# Patient Record
Sex: FEMALE | Race: WHITE | NOT HISPANIC OR LATINO | Employment: OTHER | URBAN - METROPOLITAN AREA
[De-identification: names, ages, dates, MRNs, and addresses within clinical notes are randomized per-mention and may not be internally consistent; named-entity substitution may affect disease eponyms.]

---

## 2018-02-15 ENCOUNTER — TRANSCRIBE ORDERS (OUTPATIENT)
Dept: ADMINISTRATIVE | Facility: HOSPITAL | Age: 63
End: 2018-02-15

## 2018-02-15 DIAGNOSIS — Z12.39 SCREENING BREAST EXAMINATION: Primary | ICD-10-CM

## 2018-02-20 ENCOUNTER — HOSPITAL ENCOUNTER (OUTPATIENT)
Dept: MAMMOGRAPHY | Facility: HOSPITAL | Age: 63
Discharge: HOME | End: 2018-02-20
Payer: COMMERCIAL

## 2018-02-20 DIAGNOSIS — Z12.39 SCREENING BREAST EXAMINATION: ICD-10-CM

## 2018-02-20 PROCEDURE — 77067 SCR MAMMO BI INCL CAD: CPT

## 2018-04-01 PROBLEM — R41.3 MEMORY LOSS: Status: ACTIVE | Noted: 2018-01-17

## 2018-04-01 PROBLEM — M21.372 LEFT FOOT DROP: Status: ACTIVE | Noted: 2018-01-17

## 2018-04-02 ENCOUNTER — OFFICE VISIT (OUTPATIENT)
Dept: FAMILY MEDICINE CLINIC | Facility: CLINIC | Age: 63
End: 2018-04-02
Payer: COMMERCIAL

## 2018-04-02 VITALS
OXYGEN SATURATION: 97 % | BODY MASS INDEX: 28.49 KG/M2 | RESPIRATION RATE: 16 BRPM | WEIGHT: 188 LBS | HEART RATE: 80 BPM | TEMPERATURE: 98.6 F | HEIGHT: 68 IN | SYSTOLIC BLOOD PRESSURE: 132 MMHG | DIASTOLIC BLOOD PRESSURE: 80 MMHG

## 2018-04-02 DIAGNOSIS — Z23 NEED FOR HEPATITIS B VACCINATION: Primary | ICD-10-CM

## 2018-04-02 DIAGNOSIS — G35 MULTIPLE SCLEROSIS (HCC): ICD-10-CM

## 2018-04-02 PROCEDURE — 99212 OFFICE O/P EST SF 10 MIN: CPT | Performed by: FAMILY MEDICINE

## 2018-04-02 PROCEDURE — 90471 IMMUNIZATION ADMIN: CPT | Performed by: FAMILY MEDICINE

## 2018-04-02 PROCEDURE — 90746 HEPB VACCINE 3 DOSE ADULT IM: CPT | Performed by: FAMILY MEDICINE

## 2018-04-02 RX ORDER — BACLOFEN 10 MG/1
TABLET ORAL
COMMUNITY
Start: 2018-01-17

## 2018-04-02 RX ORDER — GABAPENTIN 300 MG/1
300 CAPSULE ORAL
COMMUNITY
Start: 2018-01-17

## 2018-04-02 RX ORDER — UBIDECARENONE 75 MG
CAPSULE ORAL
COMMUNITY
Start: 2012-02-02

## 2018-04-02 RX ORDER — NAPROXEN 500 MG/1
1 TABLET ORAL 2 TIMES DAILY
COMMUNITY
Start: 2013-06-14 | End: 2018-10-17

## 2018-04-02 RX ORDER — FLUTICASONE PROPIONATE 50 MCG
2 SPRAY, SUSPENSION (ML) NASAL DAILY
COMMUNITY
Start: 2015-12-31 | End: 2018-10-17

## 2018-04-02 RX ORDER — ASCORBIC ACID 500 MG
500 TABLET ORAL
COMMUNITY
Start: 2013-04-19

## 2018-04-02 RX ORDER — NAPROXEN 375 MG/1
TABLET ORAL
COMMUNITY
Start: 2017-11-28

## 2018-04-02 RX ORDER — IBUPROFEN 800 MG/1
800 TABLET ORAL
COMMUNITY
Start: 2012-02-02

## 2018-04-02 RX ORDER — FESOTERODINE FUMARATE 4 MG/1
4 TABLET, EXTENDED RELEASE ORAL
COMMUNITY
Start: 2014-06-27 | End: 2018-10-17

## 2018-04-02 RX ORDER — VITAMIN E (DL,TOCOPHERYL ACET) 100 %
LIQUID (ML) MISCELLANEOUS
COMMUNITY
Start: 2013-04-19

## 2018-04-02 RX ORDER — FERROUS SULFATE 325(65) MG
TABLET ORAL
COMMUNITY
Start: 2013-04-19

## 2018-04-02 RX ORDER — NARATRIPTAN 2.5 MG/1
2.5 TABLET ORAL
COMMUNITY
Start: 2015-12-18

## 2018-04-02 RX ORDER — MAGNESIUM CARB/ALUMINUM HYDROX 105-160MG
TABLET,CHEWABLE ORAL
COMMUNITY
Start: 2008-01-15

## 2018-04-02 RX ORDER — ACYCLOVIR 50 MG/G
5 OINTMENT TOPICAL
COMMUNITY
Start: 2012-06-01 | End: 2021-06-30 | Stop reason: SDUPTHER

## 2018-04-02 RX ORDER — SENNA PLUS 8.6 MG/1
TABLET ORAL
COMMUNITY

## 2018-04-02 RX ORDER — SERTRALINE HYDROCHLORIDE 100 MG/1
1 TABLET, FILM COATED ORAL DAILY
COMMUNITY
End: 2018-10-17

## 2018-04-02 NOTE — PROGRESS NOTES
Chief Complaint   Patient presents with    Hepatitis C    Pneumonia    Herpes Zoster   /80 (BP Location: Left arm, Patient Position: Sitting, Cuff Size: Adult)   Pulse 80   Temp 98 6 °F (37 °C)   Resp 16   Ht 5' 8" (1 727 m)   Wt 85 3 kg (188 lb)   SpO2 97%   BMI 28 59 kg/m²     Patient presents primarily to discuss getting immunizations  She has primary progressive multiple sclerosis  She has failed multiple treatments and will be starting a Ocrevus in the near future  She has been informed by her neurologist that she should be immunized against hepatitis-B prior to starting this treatment  Once she is on it she will not be able to receive live virus vaccines  She also wishes to discuss shingles vaccine  Assessment/plan    Reviewed cautions as it pertains to administration of Ocrevus which patient will receive every 6 months indefinitely  It is unclear to me whether vaccinations or live vaccines are prohibited during treatment  I recommended that we go ahead and initiate her hepatitis B vaccine  She will need a 2nd dose in 1 month and the 3rd dose in 6 months  She will discuss with her neurologist the specifics of the vaccine prohibition  If it only extends to live virus vaccines she can begin her MS treatment any time and we can also consider giving her zoster vaccine at her convenience  1  Need for hepatitis B vaccination  HEPATITIS B VACCINE ADULT IM   2   Multiple sclerosis ([facility] [address] )

## 2018-04-03 DIAGNOSIS — Z23 NEED FOR VACCINATION: Primary | ICD-10-CM

## 2018-04-03 NOTE — TELEPHONE ENCOUNTER
Idania Carson To Sent  4/3/2018 10:10 AM   I contacted my MS Dr and she agrees that it would be best for me to have the Shingrex shot now between the second Hep shot and the month waiting period before I can start my new MS treatment with Ocrevus  She felt it is not necessary to have the 3rd Hep shot  Please prepare the script for the Shringrex so I can get it filled at the pharmacy     Many thanks   [address] [place] [address]

## 2018-04-17 ENCOUNTER — PATIENT MESSAGE (OUTPATIENT)
Dept: FAMILY MEDICINE CLINIC | Facility: CLINIC | Age: 63
End: 2018-04-17

## 2018-10-17 ENCOUNTER — ANNUAL EXAM (OUTPATIENT)
Dept: GYNECOLOGY | Facility: CLINIC | Age: 63
End: 2018-10-17
Payer: COMMERCIAL

## 2018-10-17 VITALS
DIASTOLIC BLOOD PRESSURE: 78 MMHG | SYSTOLIC BLOOD PRESSURE: 136 MMHG | BODY MASS INDEX: 30.05 KG/M2 | HEART RATE: 80 BPM | HEIGHT: 66 IN | RESPIRATION RATE: 15 BRPM | WEIGHT: 187 LBS

## 2018-10-17 DIAGNOSIS — Z11.51 SCREENING FOR HUMAN PAPILLOMAVIRUS: ICD-10-CM

## 2018-10-17 DIAGNOSIS — Z12.31 ENCOUNTER FOR SCREENING MAMMOGRAM FOR MALIGNANT NEOPLASM OF BREAST: ICD-10-CM

## 2018-10-17 DIAGNOSIS — N39.41 URGE INCONTINENCE: ICD-10-CM

## 2018-10-17 DIAGNOSIS — Z01.419 ENCOUNTER FOR WELL WOMAN EXAM WITH ROUTINE GYNECOLOGICAL EXAM: Primary | ICD-10-CM

## 2018-10-17 DIAGNOSIS — N95.2 POSTMENOPAUSAL ATROPHIC VAGINITIS: ICD-10-CM

## 2018-10-17 PROCEDURE — G0145 SCR C/V CYTO,THINLAYER,RESCR: HCPCS | Performed by: OBSTETRICS & GYNECOLOGY

## 2018-10-17 PROCEDURE — 87624 HPV HI-RISK TYP POOLED RSLT: CPT | Performed by: OBSTETRICS & GYNECOLOGY

## 2018-10-17 PROCEDURE — 99396 PREV VISIT EST AGE 40-64: CPT | Performed by: OBSTETRICS & GYNECOLOGY

## 2018-10-17 RX ORDER — TOLTERODINE 4 MG/1
4 CAPSULE, EXTENDED RELEASE ORAL DAILY
Qty: 90 CAPSULE | Refills: 4 | Status: SHIPPED | OUTPATIENT
Start: 2018-10-17 | End: 2019-10-17 | Stop reason: SDUPTHER

## 2018-10-17 NOTE — PROGRESS NOTES
Assessment/Plan:       Diagnoses and all orders for this visit:    Encounter for well woman exam with routine gynecological exam  -     Liquid-based pap, screening    Screening for human papillomavirus  -     Liquid-based pap, screening    Encounter for screening mammogram for malignant neoplasm of breast  -     Mammo screening bilateral w 3d & cad; Future    Postmenopausal atrophic vaginitis    Urge incontinence  -     tolterodine (DETROL LA) 4 mg 24 hr capsule; Take 1 capsule (4 mg total) by mouth daily    Other orders  -     ocrelizumab (OCREVUS) 300 MG/10ML SOLN; Infuse into a venous catheter once          Subjective:      Patient ID: Christelle Amaya is a 58 y o  female  The patient is a 74-year-old who presents for an annual gyn exam   She has had a previous hysterectomy and BSO for endometrial hyperplasia  She has had no vaginal bleeding  The patient denies any breast problems  The patient has been taking extended-release Detrol for urinary frequency and incontinence  She states this is were working fairly well  She still wears a pad just in case  She does not always move fast enough with her MS to make it to the bathroom on time  Overall she is happy with the results and wishes to continue  She is not sexually active  The following portions of the patient's history were reviewed and updated as appropriate: allergies, current medications, past family history, past medical history, past social history, past surgical history and problem list     Review of Systems   Constitutional: Negative  Respiratory: Negative for shortness of breath  Cardiovascular: Negative for chest pain  Gastrointestinal: Negative  Genitourinary: Negative  Musculoskeletal: Positive for gait problem  Skin: Negative  Psychiatric/Behavioral: Negative for agitation, behavioral problems and confusion           Objective:      /78 (Cuff Size: Adult)   Pulse 80   Resp 15   Ht 5' 6" (1 676 m)   Wt 84 8 kg (187 lb)   BMI 30 18 kg/m²          Physical Exam   Constitutional: She appears well-developed and well-nourished  No distress  HENT:   Head: Normocephalic  Pulmonary/Chest: Effort normal  No respiratory distress  Abdominal: She exhibits no distension and no mass  There is no tenderness  There is no rebound and no guarding  Genitourinary: Rectum normal and uterus normal  No breast swelling, tenderness, discharge or bleeding  No labial fusion  There is no rash, tenderness, lesion or injury on the right labia  There is no rash, tenderness, lesion or injury on the left labia  No erythema, tenderness or bleeding in the vagina  No foreign body in the vagina  No signs of injury around the vagina  No vaginal discharge found  Genitourinary Comments: Atrophic vaginal changes are present  Lymphadenopathy:        Right axillary: No pectoral and no lateral adenopathy present  Left axillary: No pectoral and no lateral adenopathy present  Neurological: Coordination abnormal    Weakness left leg   Skin: Skin is warm, dry and intact  She is not diaphoretic  No cyanosis  Nails show no clubbing  Psychiatric: She has a normal mood and affect   Her speech is normal and behavior is normal

## 2018-10-19 LAB
HPV HR 12 DNA CVX QL NAA+PROBE: NEGATIVE
HPV16 DNA CVX QL NAA+PROBE: NEGATIVE
HPV18 DNA CVX QL NAA+PROBE: NEGATIVE

## 2018-10-23 LAB
LAB AP GYN PRIMARY INTERPRETATION: NORMAL
Lab: NORMAL

## 2019-06-27 ENCOUNTER — HOSPITAL ENCOUNTER (OUTPATIENT)
Dept: RADIOLOGY | Age: 64
Discharge: HOME | End: 2019-06-27
Payer: COMMERCIAL

## 2019-06-27 VITALS — BODY MASS INDEX: 29.73 KG/M2 | HEIGHT: 66 IN | WEIGHT: 185 LBS

## 2019-06-27 DIAGNOSIS — Z12.31 ENCOUNTER FOR SCREENING MAMMOGRAM FOR MALIGNANT NEOPLASM OF BREAST: ICD-10-CM

## 2019-06-27 PROCEDURE — 77063 BREAST TOMOSYNTHESIS BI: CPT

## 2019-06-27 PROCEDURE — 77067 SCR MAMMO BI INCL CAD: CPT

## 2019-10-17 ENCOUNTER — ANNUAL EXAM (OUTPATIENT)
Dept: GYNECOLOGY | Facility: CLINIC | Age: 64
End: 2019-10-17
Payer: MEDICARE

## 2019-10-17 VITALS
RESPIRATION RATE: 18 BRPM | BODY MASS INDEX: 30.37 KG/M2 | DIASTOLIC BLOOD PRESSURE: 80 MMHG | HEIGHT: 66 IN | SYSTOLIC BLOOD PRESSURE: 130 MMHG | WEIGHT: 189 LBS | TEMPERATURE: 96.4 F | HEART RATE: 81 BPM

## 2019-10-17 DIAGNOSIS — Z01.419 ENCOUNTER FOR GYNECOLOGICAL EXAMINATION (GENERAL) (ROUTINE) WITHOUT ABNORMAL FINDINGS: Primary | ICD-10-CM

## 2019-10-17 DIAGNOSIS — Z12.39 BREAST CANCER SCREENING: ICD-10-CM

## 2019-10-17 DIAGNOSIS — N39.41 URGE INCONTINENCE: ICD-10-CM

## 2019-10-17 PROCEDURE — G0101 CA SCREEN;PELVIC/BREAST EXAM: HCPCS | Performed by: OBSTETRICS & GYNECOLOGY

## 2019-10-17 RX ORDER — TOLTERODINE 4 MG/1
4 CAPSULE, EXTENDED RELEASE ORAL DAILY
Qty: 90 CAPSULE | Refills: 4 | Status: SHIPPED | OUTPATIENT
Start: 2019-10-17 | End: 2021-06-30 | Stop reason: ALTCHOICE

## 2019-10-17 NOTE — PROGRESS NOTES
Assessment/Plan:       Diagnoses and all orders for this visit:    Encounter for gynecological examination (general) (routine) without abnormal findings    Urge incontinence  -     tolterodine (DETROL LA) 4 mg 24 hr capsule; Take 1 capsule (4 mg total) by mouth daily    Breast cancer screening  -     Mammo screening bilateral w cad; Future          Subjective:      Patient ID: Carole Roland is a 71 y o  female  The patient is a 26-year-old who presents for an annual gyn exam   She has had a previous hysterectomy and BSO for endometrial hyperplasia  She denies any vaginal bleeding, breast problems, or vaginal dryness  The patient has urge incontinence and she wears a pad all of the time  She attributes this to MS  It does respond somewhat to tolterodine and she would like to continue  The patient had a normal Pap and a negative HPV last year  These will not be repeated this year  She is not sexually active  Her last pap in June of this year was normal         The following portions of the patient's history were reviewed and updated as appropriate: allergies, current medications, past family history, past medical history, past social history, past surgical history and problem list     Review of Systems   Constitutional: Negative  Respiratory: Negative for shortness of breath  Cardiovascular: Negative for chest pain  Gastrointestinal: Negative  Genitourinary: Negative  Musculoskeletal: Positive for gait problem  Skin: Negative  Neurological: Positive for weakness  Psychiatric/Behavioral: Negative for agitation, behavioral problems and confusion  Objective:      /80 (BP Location: Left arm, Patient Position: Standing, Cuff Size: Standard)   Pulse 81   Temp (!) 96 4 °F (35 8 °C) (Tympanic)   Resp 18   Ht 5' 6" (1 676 m)   Wt 85 7 kg (189 lb)   BMI 30 51 kg/m²          Physical Exam   Constitutional: She appears well-developed and well-nourished  No distress  HENT:   Head: Normocephalic  Pulmonary/Chest: Effort normal  No respiratory distress  Right breast exhibits no inverted nipple, no mass, no nipple discharge, no skin change and no tenderness  Left breast exhibits no inverted nipple, no mass, no nipple discharge, no skin change and no tenderness  Breasts are symmetrical    Abdominal: She exhibits no distension and no mass  There is no tenderness  There is no rebound and no guarding  Genitourinary: Rectum normal  No labial fusion  There is no rash, tenderness, lesion or injury on the right labia  There is no rash, tenderness, lesion or injury on the left labia  No erythema, tenderness or bleeding in the vagina  No foreign body in the vagina  No signs of injury around the vagina  No vaginal discharge found  Genitourinary Comments: The urethral meatus is normal    Atrophic vaginal changes are present  The cervix, uterus and adnexa are surgically absent  Lymphadenopathy:        Right axillary: No pectoral and no lateral adenopathy present  Left axillary: No pectoral and no lateral adenopathy present  Skin: Skin is warm, dry and intact  She is not diaphoretic  No cyanosis  Nails show no clubbing  Psychiatric: She has a normal mood and affect   Her speech is normal and behavior is normal

## 2020-01-09 ENCOUNTER — OFFICE VISIT (OUTPATIENT)
Dept: URGENT CARE | Facility: CLINIC | Age: 65
End: 2020-01-09
Payer: MEDICARE

## 2020-01-09 VITALS
SYSTOLIC BLOOD PRESSURE: 148 MMHG | TEMPERATURE: 101.4 F | RESPIRATION RATE: 20 BRPM | OXYGEN SATURATION: 96 % | HEART RATE: 104 BPM | DIASTOLIC BLOOD PRESSURE: 88 MMHG

## 2020-01-09 DIAGNOSIS — J02.9 SORE THROAT: Primary | ICD-10-CM

## 2020-01-09 LAB — S PYO AG THROAT QL: NEGATIVE

## 2020-01-09 PROCEDURE — 99213 OFFICE O/P EST LOW 20 MIN: CPT | Performed by: NURSE PRACTITIONER

## 2020-01-09 PROCEDURE — 87880 STREP A ASSAY W/OPTIC: CPT | Performed by: NURSE PRACTITIONER

## 2020-01-09 PROCEDURE — G0463 HOSPITAL OUTPT CLINIC VISIT: HCPCS | Performed by: NURSE PRACTITIONER

## 2020-01-09 RX ORDER — CEFDINIR 300 MG/1
300 CAPSULE ORAL EVERY 12 HOURS SCHEDULED
Qty: 14 CAPSULE | Refills: 0 | Status: SHIPPED | OUTPATIENT
Start: 2020-01-09 | End: 2020-01-16

## 2020-01-09 RX ORDER — PREDNISONE 20 MG/1
20 TABLET ORAL 2 TIMES DAILY WITH MEALS
Qty: 10 TABLET | Refills: 0 | Status: SHIPPED | OUTPATIENT
Start: 2020-01-09 | End: 2020-01-14

## 2020-01-09 NOTE — PROGRESS NOTES
[facility]        NAME: Jose Vargas is a 71 y o  female  : 1955    MRN: [id number]  DATE: 2020  TIME: 5:22 PM    Assessment and Plan   Sore throat [J02 9]  1  Sore throat  POCT rapid strepA     Rapid strep negative   Start course of antibiotics due to fever/sore throat  Prednisone for ms flare and pharyngitis   Pt in agreement with plan of care  Patient Instructions     Follow up with PCP in 3-5 days  Proceed to  ER if symptoms worsen  Chief Complaint     Chief Complaint   Patient presents with    Sore Throat     Pt states 2 days of sore throat  Denies fever, cough or congestion  History of Present Illness   Jose Vargas presents to the clinic c/o    Yesterday started with sore throat -   Fever noted in office today   Cold, chills  Ears ache, throat pain  Denies body aches, headaches    Took Motrin and gargles with mouthwash, warm tea with honey  Still with appetite  Had the flu last week, followed by diarrhea  Has MS  -   MS flares will illness   Not on any immunosuppressants for the MS at this time -stopped them last year  Review of Systems   Review of Systems   All other systems reviewed and are negative          Current Medications     Long-Term Medications   Medication Sig Dispense Refill    acyclovir (ZOVIRAX) 5 % ointment Apply 5 % topically      Ascorbic Acid (VITAMIN C, ACEROLA,) 500 MG tablet Take 500 mg by mouth      baclofen 10 mg tablet May increase up to 4 times per day      calcium carbonate 1250 MG capsule Take 500 mg by mouth      Cholecalciferol 5000 units capsule Take 10,000 Units by mouth      cyanocobalamin (VITAMIN B-12) 100 mcg tablet 1 cap po daily      ferrous sulfate 325 (65 Fe) mg tablet daily      gabapentin (NEURONTIN) 300 mg capsule Take 300 mg by mouth      ibuprofen (MOTRIN) 800 mg tablet Take 800 mg by mouth      naproxen (NAPROSYN) 375 mg tablet ONE PO TID PRN PAIN      naratriptan (AMERGE) 2 5 MG tablet Take 2 5 mg by mouth      senna (SENOKOT TO GO) 8 6 MG tablet Take by mouth      tolterodine (DETROL LA) 4 mg 24 hr capsule Take 1 capsule (4 mg total) by mouth daily 90 capsule 4       Current Allergies     Allergies as of 01/09/2020 - Reviewed 01/09/2020   Allergen Reaction Noted    Penicillins Rash 01/28/2014            The following portions of the patient's history were reviewed and updated as appropriate: allergies, current medications, past family history, past medical history, past social history, past surgical history and problem list     Objective   /88   Pulse 104   Temp (!) 101 4 °F (38 6 °C) (Tympanic)   Resp 20   SpO2 96%        Physical Exam     Physical Exam   Constitutional: She is oriented to person, place, and time  Vital signs are normal  She appears well-developed and well-nourished  She is active and cooperative  HENT:   Head: Normocephalic and atraumatic  Right Ear: Hearing, tympanic membrane, external ear and ear canal normal    Left Ear: Hearing, tympanic membrane, external ear and ear canal normal    Nose: Nose normal  Right sinus exhibits no maxillary sinus tenderness and no frontal sinus tenderness  Left sinus exhibits no maxillary sinus tenderness and no frontal sinus tenderness  Mouth/Throat: Uvula is midline and mucous membranes are normal  Posterior oropharyngeal erythema present  Eyes: Conjunctivae and lids are normal    Cardiovascular: Normal rate, regular rhythm, S1 normal, S2 normal and normal heart sounds  Pulmonary/Chest: Effort normal and breath sounds normal    Lymphadenopathy:     She has cervical adenopathy  Neurological: She is alert and oriented to person, place, and time  Skin: Skin is warm and dry  Psychiatric: She has a normal mood and affect  Her speech is normal and behavior is normal  Judgment and thought content normal  Cognition and memory are normal    Nursing note and vitals reviewed

## 2020-01-09 NOTE — PATIENT INSTRUCTIONS
Sore Throat, Ambulatory Care   GENERAL INFORMATION:   A sore throat  is often caused by a cold or flu virus  A sore throat may also be caused by bacteria such as strep  Other causes include smoking, a runny nose, allergies, or acid reflux  Seek immediate care for the following symptoms:   · Trouble breathing or swallowing because your throat is swollen or sore    · Drooling because it hurts too much to swallow    · A painful lump in your throat that does not go away after 5 days    · A fever higher than 102? F (39?C) or lasts longer than 3 days    · Confusion    · Blood in your throat or ear  Treatment for a sore throat  will depend on the cause how severe it is  A sore throat cause by a virus will go away on its own without treatment  You will need antibiotics if your sore throat is caused by bacteria  Your sore throat should start to feel better within 3 to 5 days for both viral and bacterial infections  Care for your sore throat:   · Gargle with salt water  Mix ¼ teaspoon salt in a glass of warm water and gargle  This may help reduce swelling in your throat  · Take ibuprofen or acetaminophen:  These medicines decrease pain and fever  They are available without a doctor's order  Ask your healthcare provider which medicine is best for you  Ask how much to take and how often to take it  · Drink more liquids  Cold or warm drinks may help soothe your sore throat  Drinking liquids can also help prevent dehydration  · Use a cool-steam humidifier  to help moisten the air in your room and reduce your throat pain  · Use lozenges, ice, soft foods, or popsicles  to soothe your throat  · Rest your throat as much as possible  Try not to use your voice  This may irritate your throat and worsen your symptoms  Follow up with your healthcare provider as directed:  Write down your questions so you remember to ask them during your visits  CARE AGREEMENT:   You have the right to help plan your care   Learn about your health condition and how it may be treated  Discuss treatment options with your caregivers to decide what care you want to receive  You always have the right to refuse treatment  The above information is an  only  It is not intended as medical advice for individual conditions or treatments  Talk to your doctor, nurse or pharmacist before following any medical regimen to see if it is safe and effective for you  © 2014 [address] is for End User's use only and may not be sold, redistributed or otherwise used for commercial purposes  All illustrations and images included in CareNotes® are the copyrighted property of [employer]  or [employer]

## 2020-02-17 ENCOUNTER — OFFICE VISIT (OUTPATIENT)
Dept: FAMILY MEDICINE CLINIC | Facility: CLINIC | Age: 65
End: 2020-02-17
Payer: MEDICARE

## 2020-02-17 VITALS
WEIGHT: 189.8 LBS | BODY MASS INDEX: 30.5 KG/M2 | TEMPERATURE: 98.6 F | DIASTOLIC BLOOD PRESSURE: 76 MMHG | HEIGHT: 66 IN | OXYGEN SATURATION: 90 % | HEART RATE: 93 BPM | SYSTOLIC BLOOD PRESSURE: 138 MMHG

## 2020-02-17 DIAGNOSIS — Z23 NEED FOR VACCINATION: ICD-10-CM

## 2020-02-17 DIAGNOSIS — Z00.00 WELCOME TO MEDICARE PREVENTIVE VISIT: Primary | ICD-10-CM

## 2020-02-17 DIAGNOSIS — Z13.0 SCREENING FOR DEFICIENCY ANEMIA: ICD-10-CM

## 2020-02-17 DIAGNOSIS — Z13.1 SCREENING FOR DIABETES MELLITUS: ICD-10-CM

## 2020-02-17 DIAGNOSIS — Z13.6 SCREENING FOR CARDIOVASCULAR CONDITION: ICD-10-CM

## 2020-02-17 DIAGNOSIS — Z12.11 SCREEN FOR COLON CANCER: ICD-10-CM

## 2020-02-17 DIAGNOSIS — Z23 ENCOUNTER FOR IMMUNIZATION: ICD-10-CM

## 2020-02-17 DIAGNOSIS — G35 MULTIPLE SCLEROSIS (HCC): ICD-10-CM

## 2020-02-17 PROCEDURE — G0403 EKG FOR INITIAL PREVENT EXAM: HCPCS | Performed by: FAMILY MEDICINE

## 2020-02-17 PROCEDURE — G0402 INITIAL PREVENTIVE EXAM: HCPCS | Performed by: FAMILY MEDICINE

## 2020-02-17 PROCEDURE — 90662 IIV NO PRSV INCREASED AG IM: CPT | Performed by: FAMILY MEDICINE

## 2020-02-17 PROCEDURE — G0008 ADMIN INFLUENZA VIRUS VAC: HCPCS | Performed by: FAMILY MEDICINE

## 2020-02-17 PROCEDURE — 4040F PNEUMOC VAC/ADMIN/RCVD: CPT | Performed by: FAMILY MEDICINE

## 2020-02-17 NOTE — PATIENT INSTRUCTIONS
Medicare Preventive Visit Patient Instructions  Thank you for completing your Welcome to Medicare Visit or Medicare Annual Wellness Visit today  Your next wellness visit will be due in one year (2/17/2021)  The screening/preventive services that you may require over the next 5-10 years are detailed below  Some tests may not apply to you based off risk factors and/or age  Screening tests ordered at today's visit but not completed yet may show as past due  Also, please note that scanned in results may not display below  Preventive Screenings:  Service Recommendations Previous Testing/Comments   Colorectal Cancer Screening  * Colonoscopy    * Fecal Occult Blood Test (FOBT)/Fecal Immunochemical Test (FIT)  * Fecal DNA/Cologuard Test  * Flexible Sigmoidoscopy Age: 54-65 years old   Colonoscopy: every 10 years (may be performed more frequently if at higher risk)  OR  FOBT/FIT: every 1 year  OR  Cologuard: every 3 years  OR  Sigmoidoscopy: every 5 years  Screening may be recommended earlier than age 48 if at higher risk for colorectal cancer  Also, an individualized decision between you and your healthcare provider will decide whether screening between the ages of 74-80 would be appropriate  Colonoscopy: Not on file  FOBT/FIT: Not on file  Cologuard: Not on file  Sigmoidoscopy: Not on file         Breast Cancer Screening Age: 36 years old  Frequency: every 1-2 years  Not required if history of left and right mastectomy Mammogram: 06/27/2019    Screening Current   Cervical Cancer Screening Between the ages of 21-29, pap smear recommended once every 3 years  Between the ages of 33-67, can perform pap smear with HPV co-testing every 5 years     Recommendations may differ for women with a history of total hysterectomy, cervical cancer, or abnormal pap smears in past  Pap Smear: 10/17/2019    Screening Not Indicated   Hepatitis C Screening Once for adults born between 1945 and 1965  More frequently in patients at high risk for Hepatitis C Hep C Antibody: Not on file       Diabetes Screening 1-2 times per year if you're at risk for diabetes or have pre-diabetes Fasting glucose: No results in last 5 years   A1C: No results in last 5 years       Cholesterol Screening Once every 5 years if you don't have a lipid disorder  May order more often based on risk factors  Lipid panel: Not on file         Other Preventive Screenings Covered by Medicare:  1  Abdominal Aortic Aneurysm (AAA) Screening: covered once if your at risk  You're considered to be at risk if you have a family history of AAA  2  Lung Cancer Screening: covers low dose CT scan once per year if you meet all of the following conditions: (1) Age 50-69; (2) No signs or symptoms of lung cancer; (3) Current smoker or have quit smoking within the last 15 years; (4) You have a tobacco smoking history of at least 30 pack years (packs per day multiplied by number of years you smoked); (5) You get a written order from a healthcare provider  3  Glaucoma Screening: covered annually if you're considered high risk: (1) You have diabetes OR (2) Family history of glaucoma OR (3)  aged 48 and older OR (3)  American aged 72 and older  3  Osteoporosis Screening: covered every 2 years if you meet one of the following conditions: (1) You're estrogen deficient and at risk for osteoporosis based off medical history and other findings; (2) Have a vertebral abnormality; (3) On glucocorticoid therapy for more than 3 months; (4) Have primary hyperparathyroidism; (5) On osteoporosis medications and need to assess response to drug therapy  · Last bone density test (DXA Scan): Not on file  5  HIV Screening: covered annually if you're between the age of 12-76  Also covered annually if you are younger than 13 and older than 72 with risk factors for HIV infection  For pregnant patients, it is covered up to 3 times per pregnancy      Immunizations:  Immunization Recommendations Influenza Vaccine Annual influenza vaccination during flu season is recommended for all persons aged >= 6 months who do not have contraindications   Pneumococcal Vaccine (Prevnar and Pneumovax)  * Prevnar = PCV13  * Pneumovax = PPSV23   Adults 25-60 years old: 1-3 doses may be recommended based on certain risk factors  Adults 72 years old: Prevnar (PCV13) vaccine recommended followed by Pneumovax (PPSV23) vaccine  If already received PPSV23 since turning 65, then PCV13 recommended at least one year after PPSV23 dose  Hepatitis B Vaccine 3 dose series if at intermediate or high risk (ex: diabetes, end stage renal disease, liver disease)   Tetanus (Td) Vaccine - COST NOT COVERED BY MEDICARE PART B Following completion of primary series, a booster dose should be given every 10 years to maintain immunity against tetanus  Td may also be given as tetanus wound prophylaxis  Tdap Vaccine - COST NOT COVERED BY MEDICARE PART B Recommended at least once for all adults  For pregnant patients, recommended with each pregnancy  Shingles Vaccine (Shingrix) - COST NOT COVERED BY MEDICARE PART B  2 shot series recommended in those aged 48 and above     Health Maintenance Due:      Topic Date Due    Hepatitis C Screening  1955    CRC Screening: Colonoscopy  1955    MAMMOGRAM  06/27/2020     Immunizations Due:      Topic Date Due    Influenza Vaccine  07/01/2019    Pneumococcal Vaccine: 65+ Years (1 of 2 - PCV13) 07/09/2019     Advance Directives   What are advance directives? Advance directives are legal documents that state your wishes and plans for medical care  These plans are made ahead of time in case you lose your ability to make decisions for yourself  Advance directives can apply to any medical decision, such as the treatments you want, and if you want to donate organs  What are the types of advance directives?   There are many types of advance directives, and each state has rules about how to use them  You may choose a combination of any of the following:  · Living will: This is a written record of the treatment you want  You can also choose which treatments you do not want, which to limit, and which to stop at a certain time  This includes surgery, medicine, IV fluid, and tube feedings  · Durable power of  for healthcare [facility]): This is a written record that states who you want to make healthcare choices for you when you are unable to make them for yourself  This person, called a proxy, is usually a family member or a friend  You may choose more than 1 proxy  · Do not resuscitate (DNR) order:  A DNR order is used in case your heart stops beating or you stop breathing  It is a request not to have certain forms of treatment, such as CPR  A DNR order may be included in other types of advance directives  · Medical directive: This covers the care that you want if you are in a coma, near death, or unable to make decisions for yourself  You can list the treatments you want for each condition  Treatment may include pain medicine, surgery, blood transfusions, dialysis, IV or tube feedings, and a ventilator (breathing machine)  · Values history: This document has questions about your views, beliefs, and how you feel and think about life  This information can help others choose the care that you would choose  Why are advance directives important? An advance directive helps you control your care  Although spoken wishes may be used, it is better to have your wishes written down  Spoken wishes can be misunderstood, or not followed  Treatments may be given even if you do not want them  An advance directive may make it easier for your family to make difficult choices about your care  Fall Prevention    Fall prevention  includes ways to make your home and other areas safer  It also includes ways you can move more carefully to prevent a fall   Health conditions that cause changes in your blood pressure, vision, or muscle strength and coordination may increase your risk for falls  Medicines may also increase your risk for falls if they make you dizzy, weak, or sleepy  Fall prevention tips:   · Stand or sit up slowly  · Use assistive devices as directed  · Wear shoes that fit well and have soles that   · Wear a personal alarm  · Stay active  · Manage your medical conditions  Home Safety Tips:  · Add items to prevent falls in the bathroom  · Keep paths clear  · Install bright lights in your home  · Keep items you use often on shelves within reach  · Paint or place reflective tape on the edges of your stairs  Urinary Incontinence   Urinary incontinence (UI)  is when you lose control of your bladder  UI develops because your bladder cannot store or empty urine properly  The 3 most common types of UI are stress incontinence, urge incontinence, or both  Medicines:   · May be given to help strengthen your bladder control  Report any side effects of medication to your healthcare provider  Do pelvic muscle exercises often:  Your pelvic muscles help you stop urinating  Squeeze these muscles tight for 5 seconds, then relax for 5 seconds  Gradually work up to squeezing for 10 seconds  Do 3 sets of 15 repetitions a day, or as directed  This will help strengthen your pelvic muscles and improve bladder control  Train your bladder:  Go to the bathroom at set times, such as every 2 hours, even if you do not feel the urge to go  You can also try to hold your urine when you feel the urge to go  For example, hold your urine for 5 minutes when you feel the urge to go  As that becomes easier, hold your urine for 10 minutes  Self-care:   · Keep a UI record  Write down how often you leak urine and how much you leak  Make a note of what you were doing when you leaked urine  · Drink liquids as directed  You may need to limit the amount of liquid you drink to help control your urine leakage   Do not drink any liquid right before you go to bed  Limit or do not have drinks that contain caffeine or alcohol  · Prevent constipation  Eat a variety of high-fiber foods  Good examples are high-fiber cereals, beans, vegetables, and whole-grain breads  Walking is the best way to trigger your intestines to have a bowel movement  · Exercise regularly and maintain a healthy weight  Weight loss and exercise will decrease pressure on your bladder and help you control your leakage  · Use a catheter as directed  to help empty your bladder  A catheter is a tiny, plastic tube that is put into your bladder to drain your urine  · Go to behavior therapy as directed  Behavior therapy may be used to help you learn to control your urge to urinate  Weight Management   Why it is important to manage your weight:  Being overweight increases your risk of health conditions such as heart disease, high blood pressure, type 2 diabetes, and certain types of cancer  It can also increase your risk for osteoarthritis, sleep apnea, and other respiratory problems  Aim for a slow, steady weight loss  Even a small amount of weight loss can lower your risk of health problems  How to lose weight safely:  A safe and healthy way to lose weight is to eat fewer calories and get regular exercise  You can lose up about 1 pound a week by decreasing the number of calories you eat by 500 calories each day  Healthy meal plan for weight management:  A healthy meal plan includes a variety of foods, contains fewer calories, and helps you stay healthy  A healthy meal plan includes the following:  · Eat whole-grain foods more often  A healthy meal plan should contain fiber  Fiber is the part of grains, fruits, and vegetables that is not broken down by your body  Whole-grain foods are healthy and provide extra fiber in your diet  Some examples of whole-grain foods are whole-wheat breads and pastas, oatmeal, brown rice, and bulgur    · Eat a variety of vegetables every day  Include dark, leafy greens such as spinach, kale, alona greens, and mustard greens  Eat yellow and orange vegetables such as carrots, sweet potatoes, and winter squash  · Eat a variety of fruits every day  Choose fresh or canned fruit (canned in its own juice or light syrup) instead of juice  Fruit juice has very little or no fiber  · Eat low-fat dairy foods  Drink fat-free (skim) milk or 1% milk  Eat fat-free yogurt and low-fat cottage cheese  Try low-fat cheeses such as mozzarella and other reduced-fat cheeses  · Choose meat and other protein foods that are low in fat  Choose beans or other legumes such as split peas or lentils  Choose fish, skinless poultry (chicken or turkey), or lean cuts of red meat (beef or pork)  Before you cook meat or poultry, cut off any visible fat  · Use less fat and oil  Try baking foods instead of frying them  Add less fat, such as margarine, sour cream, regular salad dressing and mayonnaise to foods  Eat fewer high-fat foods  Some examples of high-fat foods include french fries, doughnuts, ice cream, and cakes  · Eat fewer sweets  Limit foods and drinks that are high in sugar  This includes candy, cookies, regular soda, and sweetened drinks  Exercise:  Exercise at least 30 minutes per day on most days of the week  Some examples of exercise include walking, biking, dancing, and swimming  You can also fit in more physical activity by taking the stairs instead of the elevator or parking farther away from stores  Ask your healthcare provider about the best exercise plan for you  © Copyright [employer] 2018 Information is for End User's use only and may not be sold, redistributed or otherwise used for commercial purposes   All illustrations and images included in CareNotes® are the copyrighted property of [employer]  or [address] [place] [address] [facility]

## 2020-02-17 NOTE — ASSESSMENT & PLAN NOTE
Questionnaire reviewed  Immunizations and screening guidelines discussed  Advance directive not in place

## 2020-02-17 NOTE — PROGRESS NOTES
Assessment and Plan:     Problem List Items Addressed This Visit     Welcome to Medicare preventive visit - Primary     Questionnaire reviewed  Immunizations and screening guidelines discussed  Advance directive not in place  Multiple sclerosis ([facility] [address] )      Other Visit Diagnoses     Encounter for immunization        Relevant Orders    influenza vaccine, [id number], high-dose, PF 0 5 mL (FLUZONE HIGH-DOSE) (Completed)    Screening for cardiovascular condition        Relevant Orders    POCT ECG (Completed)    Lipid Panel with Direct LDL reflex    Screen for colon cancer        Relevant Orders    Ambulatory referral for colonoscopy    Need for vaccination        Relevant Medications    Zoster Vac Recomb Adjuvanted (Shingrix) 50 MCG/0 5ML SUSR    Other Relevant Orders    PNEUMOCOCCAL CONJUGATE VACCINE 13-VALENT GREATER THAN 6 MONTHS    Screening for deficiency anemia        Relevant Orders    CBC and differential    Screening for diabetes mellitus        Relevant Orders    Comprehensive metabolic panel    Screening for thyroid disorder            BMI Counseling: Body mass index is 30 63 kg/m²  The BMI is above normal  Nutrition recommendations include decreasing portion sizes, encouraging healthy choices of fruits and vegetables, consuming healthier snacks, moderation in carbohydrate intake and increasing intake of lean protein  Exercise recommendations include exercising 3-5 times per week  Preventive health issues were discussed with patient, and age appropriate screening tests were ordered as noted in patient's After Visit Summary  Personalized health advice and appropriate referrals for health education or preventive services given if needed, as noted in patient's After Visit Summary  History of Present Illness:     Patient presents for Welcome to Medicare visit  Patient Care Team:  Rubio Walker DO as PCP - General     Review of Systems:     Review of Systems   Constitutional: Negative  HENT: Positive for congestion and postnasal drip  Respiratory: Negative  Cardiovascular: Negative  Gastrointestinal: Negative  Genitourinary: Negative  Musculoskeletal: Negative  Psychiatric/Behavioral: Negative         Problem List:     Patient Active Problem List   Diagnosis    Left foot drop    Memory loss    Migraine    Multiple sclerosis ([facility] [address] )    Neurogenic bladder    Postmenopausal atrophic vaginitis    Reactive depression (situational)    Vitamin D deficiency    Welcome to Medicare preventive visit      Past Medical and Surgical History:     Past Medical History:   Diagnosis Date    Multiple sclerosis ([facility] [address] )     Vaginal Pap smear 10/11/2017    WNL     Past Surgical History:   Procedure Laterality Date    BLADDER SURGERY      KNEE SURGERY Right     & Left knee     OOPHORECTOMY Bilateral     age 61   [facility] TOTAL ABDOMINAL HYSTERECTOMY      pt believes cervix still present , age 61      Family History:     Family History   Problem Relation Age of Onset    Osteoarthritis Mother     Ulcers Father     Lung cancer Father     Hypertension Brother     No Known Problems Sister     No Known Problems Daughter     No Known Problems Maternal Grandmother     No Known Problems Maternal Grandfather     No Known Problems Paternal Grandmother     No Known Problems Paternal Grandfather     No Known Problems Brother     No Known Problems Maternal Aunt     Breast cancer Maternal Aunt 48    No Known Problems Maternal Aunt     No Known Problems Maternal Aunt     No Known Problems Paternal Aunt     No Known Problems Paternal Aunt       Social History:        Social History     Socioeconomic History    Marital status: /Civil Union     Spouse name: None    Number of children: None    Years of education: None    Highest education level: None   Occupational History    None   Social Needs    Financial resource strain: None    Food insecurity:     Worry: None     Inability: None  Transportation needs:     Medical: None     Non-medical: None   Tobacco Use    Smoking status: Never Smoker    Smokeless tobacco: Never Used   Substance and Sexual Activity    Alcohol use: Yes     Frequency: 2-4 times a month     Drinks per session: 1 or 2     Binge frequency: Weekly     Comment: occasional    Drug use: No    Sexual activity: Not Currently   Lifestyle    Physical activity:     Days per week: None     Minutes per session: None    Stress: None   Relationships    Social connections:     Talks on phone: None     Gets together: None     Attends Jain service: None     Active member of club or organization: None     Attends meetings of clubs or organizations: None     Relationship status: None    Intimate partner violence:     Fear of current or ex partner: None     Emotionally abused: None     Physically abused: None     Forced sexual activity: None   Other Topics Concern    None   Social History Narrative    None      Medications and Allergies:     Current Outpatient Medications   Medication Sig Dispense Refill    acyclovir (ZOVIRAX) 5 % ointment Apply 5 % topically      Ascorbic Acid (VITAMIN C, ACEROLA,) 500 MG tablet Take 500 mg by mouth      baclofen 10 mg tablet May increase up to 4 times per day      calcium carbonate 1250 MG capsule Take 500 mg by mouth      Cholecalciferol 5000 units capsule Take 10,000 Units by mouth      cyanocobalamin (VITAMIN B-12) 100 mcg tablet 1 cap po daily      ferrous sulfate 325 (65 Fe) mg tablet daily      gabapentin (NEURONTIN) 300 mg capsule Take 300 mg by mouth      Glucosamine-Chondroitin 750-600 MG TABS Take by mouth      ibuprofen (MOTRIN) 800 mg tablet Take 800 mg by mouth      Magnesium 100 MG CAPS Take by mouth      naproxen (NAPROSYN) 375 mg tablet ONE PO TID PRN PAIN      naratriptan (AMERGE) 2 5 MG tablet Take 2 5 mg by mouth      senna (SENOKOT TO GO) 8 6 MG tablet Take by mouth      tolterodine (DETROL LA) 4 mg 24 hr capsule Take 1 capsule (4 mg total) by mouth daily 90 capsule 4    Vitamin E Acetate LIQD daily      Zoster Vac Recomb Adjuvanted (Shingrix) 50 MCG/0 5ML SUSR Inject 50 mcg into a muscle once for 1 dose 1 each 1     No current facility-administered medications for this visit  Allergies   Allergen Reactions    Penicillins Rash      Immunizations:     Immunization History   Administered Date(s) Administered    Hep B, adult 04/02/2018    INFLUENZA 12/12/2014, 11/01/2018    Influenza TIV (IM) 12/03/2014    Influenza, high dose seasonal 0 5 mL 02/17/2020    Tdap 06/19/2006, 10/11/2013      Health Maintenance:         Topic Date Due    Hepatitis C Screening  1955    CRC Screening: Colonoscopy  1955    MAMMOGRAM  06/27/2020         Topic Date Due    Influenza Vaccine  07/01/2019    Pneumococcal Vaccine: 65+ Years (1 of 2 - PCV13) 07/09/2019      Medicare Screening Tests and Risk Assessments:     Keren Jacques is here for her Welcome to Medicare visit  Health Risk Assessment:   Patient rates overall health as very good  Patient feels that their physical health rating is same  Eyesight was rated as same  Hearing was rated as same  Patient feels that their emotional and mental health rating is same  Pain experienced in the last 7 days has been some  Patient's pain rating has been 6/10  Patient states that she has experienced no weight loss or gain in last 6 months  Depression Screening:   PHQ-2 Score: 0  PHQ-9 Score: 1      Fall Risk Screening: In the past year, patient has experienced: history of falling in past year    Number of falls: 2 or more  Injured during fall?: No    Feels unsteady when standing or walking?: Yes    Worried about falling?: No      Urinary Incontinence Screening:   Patient has leaked urine accidently in the last six months  Home Safety:  Patient has trouble with stairs inside or outside of their home   Patient has working smoke alarms and has working carbon monoxide detector  Home safety hazards include: none  Nutrition:   Current diet is Regular and Limited junk food  Medications:   Patient is currently taking over-the-counter supplements  OTC medications include: see medication list  Patient is able to manage medications  Activities of Daily Living (ADLs)/Instrumental Activities of Daily Living (IADLs):   Walk and transfer into and out of bed and chair?: Yes  Dress and groom yourself?: Yes    Bathe or shower yourself?: Yes    Feed yourself?  Yes  Do your laundry/housekeeping?: Yes  Manage your money, pay your bills and track your expenses?: Yes  Make your own meals?: Yes    Do your own shopping?: Yes    Previous Hospitalizations:   Any hospitalizations or ED visits within the last 12 months?: No      Advance Care Planning:   Living will: No    Durable POA for healthcare: No    Advanced directive: No    Five wishes given: Yes      Cognitive Screening:   Provider or family/friend/caregiver concerned regarding cognition?: No    PREVENTIVE SCREENINGS      Cardiovascular Screening:      Due for: Lipid Panel      Diabetes Screening:       Due for: Blood Glucose      Colorectal Cancer Screening:       Due for: Colonoscopy - Low Risk      Breast Cancer Screening:     General: Screening Current      Cervical Cancer Screening:    General: Screening Not Indicated      Osteoporosis Screening:    General: Risks and Benefits Discussed      Abdominal Aortic Aneurysm (AAA) Screening:        General: Screening Not Indicated      Lung Cancer Screening:     General: Screening Not Indicated      Hepatitis C Screening:    General: Screening Current     Visual Acuity Screening    Right eye Left eye Both eyes   Without correction:      With correction: 20/30 20/25 20/25        Physical Exam:     /76 (BP Location: Left arm, Patient Position: Sitting, Cuff Size: Adult)   Pulse 93   Temp 98 6 °F (37 °C) (Tympanic)   Ht 5' 6" (1 676 m)   Wt 86 1 kg (189 lb 12 8 oz)   SpO2 90%   BMI 30 63 kg/m²     Physical Exam   Constitutional: She is oriented to person, place, and time  She appears well-developed and well-nourished  No distress  HENT:   Head: Normocephalic and atraumatic  Eyes: Pupils are equal, round, and reactive to light  Conjunctivae are normal  Right eye exhibits no discharge  Neck: Normal range of motion  No thyromegaly present  Cardiovascular: Normal rate and regular rhythm  Pulmonary/Chest: Effort normal and breath sounds normal  No respiratory distress  Lymphadenopathy:     She has no cervical adenopathy  Neurological: She is alert and oriented to person, place, and time  Skin: Skin is warm and dry  She is not diaphoretic  Psychiatric: She has a normal mood and affect  Her behavior is normal  Judgment and thought content normal    Nursing note and vitals reviewed        Bo Stark DO

## 2020-02-19 PROCEDURE — G0009 ADMIN PNEUMOCOCCAL VACCINE: HCPCS | Performed by: FAMILY MEDICINE

## 2020-02-19 PROCEDURE — 90670 PCV13 VACCINE IM: CPT | Performed by: FAMILY MEDICINE

## 2020-09-22 ENCOUNTER — TRANSCRIBE ORDERS (OUTPATIENT)
Dept: ADMINISTRATIVE | Facility: HOSPITAL | Age: 65
End: 2020-09-22

## 2020-09-22 DIAGNOSIS — Z12.31 ENCOUNTER FOR SCREENING MAMMOGRAM FOR MALIGNANT NEOPLASM OF BREAST: Primary | ICD-10-CM

## 2020-12-17 ENCOUNTER — HOSPITAL ENCOUNTER (OUTPATIENT)
Dept: MAMMOGRAPHY | Facility: MEDICAL CENTER | Age: 65
Discharge: HOME | End: 2020-12-17
Payer: MEDICARE

## 2020-12-17 VITALS — HEIGHT: 66 IN | WEIGHT: 189 LBS | BODY MASS INDEX: 30.37 KG/M2

## 2020-12-17 DIAGNOSIS — Z12.31 ENCOUNTER FOR SCREENING MAMMOGRAM FOR MALIGNANT NEOPLASM OF BREAST: ICD-10-CM

## 2020-12-17 PROCEDURE — 77063 BREAST TOMOSYNTHESIS BI: CPT

## 2020-12-17 PROCEDURE — 77067 SCR MAMMO BI INCL CAD: CPT

## 2020-12-30 ENCOUNTER — HOSPITAL ENCOUNTER (OUTPATIENT)
Dept: MAMMOGRAPHY | Facility: CLINIC | Age: 65
Discharge: HOME | End: 2020-12-30
Payer: MEDICARE

## 2020-12-30 ENCOUNTER — HOSPITAL ENCOUNTER (OUTPATIENT)
Dept: ULTRASOUND IMAGING | Facility: CLINIC | Age: 65
Discharge: HOME | End: 2020-12-30
Payer: MEDICARE

## 2020-12-30 VITALS — BODY MASS INDEX: 30.37 KG/M2 | HEIGHT: 66 IN | WEIGHT: 189 LBS

## 2020-12-30 DIAGNOSIS — R92.8 ABNORMAL SCREENING MAMMOGRAM: ICD-10-CM

## 2020-12-30 PROCEDURE — 77065 DX MAMMO INCL CAD UNI: CPT

## 2020-12-30 PROCEDURE — G0279 TOMOSYNTHESIS, MAMMO: HCPCS

## 2020-12-30 PROCEDURE — 76642 ULTRASOUND BREAST LIMITED: CPT

## 2021-05-26 ENCOUNTER — TELEPHONE (OUTPATIENT)
Dept: FAMILY MEDICINE CLINIC | Facility: CLINIC | Age: 66
End: 2021-05-26

## 2021-05-26 DIAGNOSIS — Z11.59 ENCOUNTER FOR HEPATITIS C SCREENING TEST FOR LOW RISK PATIENT: ICD-10-CM

## 2021-05-26 DIAGNOSIS — Z13.0 SCREENING FOR DEFICIENCY ANEMIA: Primary | ICD-10-CM

## 2021-05-26 DIAGNOSIS — Z13.1 SCREENING FOR DIABETES MELLITUS: ICD-10-CM

## 2021-05-26 DIAGNOSIS — Z13.6 SCREENING FOR CARDIOVASCULAR CONDITION: ICD-10-CM

## 2021-05-26 DIAGNOSIS — Z13.29 SCREENING FOR THYROID DISORDER: ICD-10-CM

## 2021-06-18 ENCOUNTER — LAB (OUTPATIENT)
Dept: LAB | Facility: MEDICAL CENTER | Age: 66
End: 2021-06-18
Payer: MEDICARE

## 2021-06-18 DIAGNOSIS — Z13.0 SCREENING FOR IRON DEFICIENCY ANEMIA: Primary | ICD-10-CM

## 2021-06-18 DIAGNOSIS — G35 MULTIPLE SCLEROSIS (HCC): ICD-10-CM

## 2021-06-18 DIAGNOSIS — N31.9 NEUROGENIC BLADDER: ICD-10-CM

## 2021-06-18 DIAGNOSIS — E55.9 VITAMIN D DEFICIENCY: ICD-10-CM

## 2021-06-18 DIAGNOSIS — R41.3 MEMORY LOSS: ICD-10-CM

## 2021-06-18 DIAGNOSIS — F32.9 REACTIVE DEPRESSION (SITUATIONAL): ICD-10-CM

## 2021-06-18 DIAGNOSIS — N95.2 POSTMENOPAUSAL ATROPHIC VAGINITIS: ICD-10-CM

## 2021-06-18 DIAGNOSIS — M21.372 LEFT FOOT DROP: ICD-10-CM

## 2021-06-18 DIAGNOSIS — G43.809 OTHER MIGRAINE WITHOUT STATUS MIGRAINOSUS, NOT INTRACTABLE: ICD-10-CM

## 2021-06-18 LAB
ALBUMIN SERPL BCP-MCNC: 3.8 G/DL (ref 3.5–5)
ALP SERPL-CCNC: 65 U/L (ref 46–116)
ALT SERPL W P-5'-P-CCNC: 25 U/L (ref 12–78)
ANION GAP SERPL CALCULATED.3IONS-SCNC: 3 MMOL/L (ref 4–13)
AST SERPL W P-5'-P-CCNC: 15 U/L (ref 5–45)
BASOPHILS # BLD AUTO: 0.03 THOUSANDS/ΜL (ref 0–0.1)
BASOPHILS NFR BLD AUTO: 1 % (ref 0–1)
BILIRUB SERPL-MCNC: 0.5 MG/DL (ref 0.2–1)
BUN SERPL-MCNC: 12 MG/DL (ref 5–25)
CALCIUM SERPL-MCNC: 9.1 MG/DL (ref 8.3–10.1)
CHLORIDE SERPL-SCNC: 104 MMOL/L (ref 100–108)
CHOLEST SERPL-MCNC: 179 MG/DL (ref 50–200)
CO2 SERPL-SCNC: 33 MMOL/L (ref 21–32)
CREAT SERPL-MCNC: 0.61 MG/DL (ref 0.6–1.3)
EOSINOPHIL # BLD AUTO: 0.22 THOUSAND/ΜL (ref 0–0.61)
EOSINOPHIL NFR BLD AUTO: 5 % (ref 0–6)
ERYTHROCYTE [DISTWIDTH] IN BLOOD BY AUTOMATED COUNT: 12.5 % (ref 11.6–15.1)
GFR SERPL CREATININE-BSD FRML MDRD: 95 ML/MIN/1.73SQ M
GLUCOSE P FAST SERPL-MCNC: 84 MG/DL (ref 65–99)
HCT VFR BLD AUTO: 44.2 % (ref 34.8–46.1)
HDLC SERPL-MCNC: 73 MG/DL
HGB BLD-MCNC: 14.9 G/DL (ref 11.5–15.4)
IMM GRANULOCYTES # BLD AUTO: 0.01 THOUSAND/UL (ref 0–0.2)
IMM GRANULOCYTES NFR BLD AUTO: 0 % (ref 0–2)
LDLC SERPL CALC-MCNC: 95 MG/DL (ref 0–100)
LYMPHOCYTES # BLD AUTO: 1.29 THOUSANDS/ΜL (ref 0.6–4.47)
LYMPHOCYTES NFR BLD AUTO: 26 % (ref 14–44)
MCH RBC QN AUTO: 31 PG (ref 26.8–34.3)
MCHC RBC AUTO-ENTMCNC: 33.7 G/DL (ref 31.4–37.4)
MCV RBC AUTO: 92 FL (ref 82–98)
MONOCYTES # BLD AUTO: 0.41 THOUSAND/ΜL (ref 0.17–1.22)
MONOCYTES NFR BLD AUTO: 8 % (ref 4–12)
NEUTROPHILS # BLD AUTO: 2.97 THOUSANDS/ΜL (ref 1.85–7.62)
NEUTS SEG NFR BLD AUTO: 60 % (ref 43–75)
NRBC BLD AUTO-RTO: 0 /100 WBCS
PLATELET # BLD AUTO: 232 THOUSANDS/UL (ref 149–390)
PMV BLD AUTO: 11 FL (ref 8.9–12.7)
POTASSIUM SERPL-SCNC: 3.8 MMOL/L (ref 3.5–5.3)
PROT SERPL-MCNC: 7 G/DL (ref 6.4–8.2)
RBC # BLD AUTO: 4.81 MILLION/UL (ref 3.81–5.12)
SODIUM SERPL-SCNC: 140 MMOL/L (ref 136–145)
TRIGL SERPL-MCNC: 54 MG/DL
TSH SERPL DL<=0.05 MIU/L-ACNC: 1.66 UIU/ML (ref 0.36–3.74)
WBC # BLD AUTO: 4.93 THOUSAND/UL (ref 4.31–10.16)

## 2021-06-18 PROCEDURE — 85025 COMPLETE CBC W/AUTO DIFF WBC: CPT

## 2021-06-18 PROCEDURE — 86803 HEPATITIS C AB TEST: CPT

## 2021-06-18 PROCEDURE — 36415 COLL VENOUS BLD VENIPUNCTURE: CPT

## 2021-06-18 PROCEDURE — 80053 COMPREHEN METABOLIC PANEL: CPT

## 2021-06-18 PROCEDURE — 80061 LIPID PANEL: CPT

## 2021-06-18 PROCEDURE — 84443 ASSAY THYROID STIM HORMONE: CPT

## 2021-06-19 LAB
HCV AB S/CO SERPL IA: <0.1 S/CO RATIO (ref 0–0.9)
SL AMB INTERPRETATION: NORMAL

## 2021-06-30 ENCOUNTER — APPOINTMENT (OUTPATIENT)
Dept: RADIOLOGY | Facility: CLINIC | Age: 66
End: 2021-06-30
Payer: MEDICARE

## 2021-06-30 ENCOUNTER — OFFICE VISIT (OUTPATIENT)
Dept: FAMILY MEDICINE CLINIC | Facility: CLINIC | Age: 66
End: 2021-06-30
Payer: MEDICARE

## 2021-06-30 VITALS
BODY MASS INDEX: 29.54 KG/M2 | OXYGEN SATURATION: 96 % | DIASTOLIC BLOOD PRESSURE: 78 MMHG | HEART RATE: 78 BPM | WEIGHT: 188.2 LBS | TEMPERATURE: 98 F | SYSTOLIC BLOOD PRESSURE: 134 MMHG | HEIGHT: 67 IN

## 2021-06-30 DIAGNOSIS — Z00.00 MEDICARE ANNUAL WELLNESS VISIT, SUBSEQUENT: Primary | ICD-10-CM

## 2021-06-30 DIAGNOSIS — B00.1 COLD SORE: ICD-10-CM

## 2021-06-30 DIAGNOSIS — G35 MULTIPLE SCLEROSIS (HCC): ICD-10-CM

## 2021-06-30 DIAGNOSIS — Z23 NEED FOR VACCINATION: ICD-10-CM

## 2021-06-30 DIAGNOSIS — M25.551 RIGHT HIP PAIN: ICD-10-CM

## 2021-06-30 DIAGNOSIS — Z12.11 SCREEN FOR COLON CANCER: ICD-10-CM

## 2021-06-30 PROCEDURE — G0438 PPPS, INITIAL VISIT: HCPCS | Performed by: FAMILY MEDICINE

## 2021-06-30 PROCEDURE — G0009 ADMIN PNEUMOCOCCAL VACCINE: HCPCS | Performed by: FAMILY MEDICINE

## 2021-06-30 PROCEDURE — 1123F ACP DISCUSS/DSCN MKR DOCD: CPT | Performed by: FAMILY MEDICINE

## 2021-06-30 PROCEDURE — 73502 X-RAY EXAM HIP UNI 2-3 VIEWS: CPT

## 2021-06-30 PROCEDURE — 99213 OFFICE O/P EST LOW 20 MIN: CPT | Performed by: FAMILY MEDICINE

## 2021-06-30 PROCEDURE — 90732 PPSV23 VACC 2 YRS+ SUBQ/IM: CPT | Performed by: FAMILY MEDICINE

## 2021-06-30 RX ORDER — ACYCLOVIR 50 MG/G
OINTMENT TOPICAL
Qty: 15 G | Refills: 2 | Status: SHIPPED | OUTPATIENT
Start: 2021-06-30 | End: 2021-07-08

## 2021-06-30 NOTE — PROGRESS NOTES
[facility]      NAME: Tanner Wilson  AGE: 76 y o  SEX: female  : 1955   MRN: [id number]    DATE: 2021  TIME: 2:36 PM    Assessment and Plan     Problem List Items Addressed This Visit     Multiple sclerosis ([address] )      Other Visit Diagnoses     Medicare annual wellness visit, subsequent    -  Primary     questionnaire reviewed  Immunization health screening history updated  Advance directive in place  Right hip pain        Relevant Orders    XR hip/pelv 2-3 vws right if performed    Ambulatory referral to Physical Therapy    Need for vaccination        Relevant Orders    PNEUMOCOCCAL POLYSACCHARIDE VACCINE 23-VALENT =>1YO SQ IM    Screen for colon cancer        Relevant Orders    Ambulatory referral for colonoscopy    Cold sore        Relevant Medications    acyclovir (ZOVIRAX) 5 % ointment              Return to office in:   One year    Chief Complaint     Chief Complaint   Patient presents with    Follow-up    Medicare Wellness Visit       History of Present Illness      Patient presents for routine follow-up chronic medical problems  Primary ongoing medical issue is multiple sclerosis  She treats with her neurologist for that  Recently her medications were discontinued given her age and stability of her condition  She is currently on no medications  She does complain of right hip pain which she feels is related to her abnormal gait from her MS and her footdrop  Pain has been gradually getting worse over the past 6 months  The following portions of the patient's history were reviewed and updated as appropriate: allergies, current medications, past family history, past medical history, past social history, past surgical history and problem list     Review of Systems   Review of Systems   Constitutional: Negative  Respiratory: Negative  Cardiovascular: Negative  Gastrointestinal: Negative  Genitourinary: Negative      Musculoskeletal: Positive for arthralgias (Right hip)  Psychiatric/Behavioral: Negative  Active Problem List     Patient Active Problem List   Diagnosis    Left foot drop    Memory loss    Migraine    Multiple sclerosis ([address] )    Neurogenic bladder    Postmenopausal atrophic vaginitis    Reactive depression (situational)    Vitamin D deficiency    Welcome to Medicare preventive visit       Objective   /78   Pulse 78   Temp 98 °F (36 7 °C) (Temporal)   Ht 5' 7 2" (1 707 m)   Wt 85 4 kg (188 lb 3 2 oz)   SpO2 96%   BMI 29 30 kg/m²     Physical Exam  Vitals and nursing note reviewed  Constitutional:       General: She is not in acute distress  Appearance: She is well-developed  She is not diaphoretic  HENT:      Head: Normocephalic and atraumatic  Eyes:      General:         Right eye: No discharge  Conjunctiva/sclera: Conjunctivae normal       Pupils: Pupils are equal, round, and reactive to light  Neck:      Thyroid: No thyromegaly  Cardiovascular:      Rate and Rhythm: Normal rate and regular rhythm  Pulmonary:      Effort: Pulmonary effort is normal  No respiratory distress  Breath sounds: Normal breath sounds  Musculoskeletal:      Cervical back: Normal range of motion  Lymphadenopathy:      Cervical: No cervical adenopathy  Skin:     General: Skin is warm and dry  Neurological:      Mental Status: She is alert and oriented to person, place, and time  Psychiatric:         Behavior: Behavior normal          Thought Content:  Thought content normal          Judgment: Judgment normal            Current Medications     Current Outpatient Medications:     acyclovir (ZOVIRAX) 5 % ointment, Apply topically every 3 (three) hours, Disp: 15 g, Rfl: 2    Ascorbic Acid (VITAMIN C, ACEROLA,) 500 MG tablet, Take 500 mg by mouth, Disp: , Rfl:     baclofen 10 mg tablet, May increase up to 4 times per day, Disp: , Rfl:     calcium carbonate 1250 MG capsule, Take 500 mg by mouth, Disp: , Rfl:   Cholecalciferol 5000 units capsule, Take 10,000 Units by mouth, Disp: , Rfl:     cyanocobalamin (VITAMIN B-12) 100 mcg tablet, 1 cap po daily, Disp: , Rfl:     ferrous sulfate 325 (65 Fe) mg tablet, daily, Disp: , Rfl:     gabapentin (NEURONTIN) 300 mg capsule, Take 300 mg by mouth, Disp: , Rfl:     Glucosamine-Chondroitin 750-600 MG TABS, Take by mouth, Disp: , Rfl:     ibuprofen (MOTRIN) 800 mg tablet, Take 800 mg by mouth, Disp: , Rfl:     Magnesium 100 MG CAPS, Take by mouth, Disp: , Rfl:     naproxen (NAPROSYN) 375 mg tablet, ONE PO TID PRN PAIN, Disp: , Rfl:     naratriptan (AMERGE) 2 5 MG tablet, Take 2 5 mg by mouth, Disp: , Rfl:     senna (SENOKOT TO GO) 8 6 MG tablet, Take by mouth, Disp: , Rfl:     Vitamin E Acetate LIQD, daily, Disp: , Rfl:     Health Maintenance     Health Maintenance   Topic Date Due    Colorectal Cancer Screening  Never done   [facility] Annual Wellness Visit (AWV)  02/17/2021    Pneumococcal Vaccine: 65+ Years (2 of 2 - PPSV23) 02/19/2021    Influenza Vaccine (Season Ended) 09/01/2021    BMI: Adult  12/30/2021    MAMMOGRAM  12/30/2021    Fall Risk  06/30/2022    BMI: Followup Plan  06/30/2022    Depression Remission PHQ  06/30/2022    DTaP,Tdap,and Td Vaccines (3 - Td or Tdap) 10/11/2023    Hepatitis C Screening  Completed    COVID-19 Vaccine  Completed    HIB Vaccine  Aged Out    Hepatitis B Vaccine  Aged Out    IPV Vaccine  Aged Out    Hepatitis A Vaccine  Aged Out    Meningococcal ACWY Vaccine  Aged Out    HPV Vaccine  Aged Out     Immunization History   Administered Date(s) Administered    Hep B, adult 04/02/2018    INFLUENZA 12/12/2014, 11/01/2018    Influenza, high dose seasonal 0 7 mL 02/17/2020    Influenza, seasonal, injectable 12/03/2014    Pneumococcal Conjugate 13-Valent 02/19/2020    SARS-CoV-2 / COVID-19 03/05/2021    SARS-CoV-2 / COVID-19 mRNA IM (Pfizer-BioNTech) 03/05/2021, 03/29/2021    Tdap 06/19/2006, 10/11/2013 Per Whelan DO  [facility]

## 2021-06-30 NOTE — PATIENT INSTRUCTIONS
Medicare Preventive Visit Patient Instructions  Thank you for completing your Welcome to Medicare Visit or Medicare Annual Wellness Visit today  Your next wellness visit will be due in one year (7/1/2022)  The screening/preventive services that you may require over the next 5-10 years are detailed below  Some tests may not apply to you based off risk factors and/or age  Screening tests ordered at today's visit but not completed yet may show as past due  Also, please note that scanned in results may not display below  Preventive Screenings:  Service Recommendations Previous Testing/Comments   Colorectal Cancer Screening  * Colonoscopy    * Fecal Occult Blood Test (FOBT)/Fecal Immunochemical Test (FIT)  * Fecal DNA/Cologuard Test  * Flexible Sigmoidoscopy Age: 54-65 years old   Colonoscopy: every 10 years (may be performed more frequently if at higher risk)  OR  FOBT/FIT: every 1 year  OR  Cologuard: every 3 years  OR  Sigmoidoscopy: every 5 years  Screening may be recommended earlier than age 48 if at higher risk for colorectal cancer  Also, an individualized decision between you and your healthcare provider will decide whether screening between the ages of 74-80 would be appropriate  Colonoscopy: Not on file  FOBT/FIT: Not on file  Cologuard: Not on file  Sigmoidoscopy: Not on file          Breast Cancer Screening Age: 36 years old  Frequency: every 1-2 years  Not required if history of left and right mastectomy Mammogram: 12/30/2020    Screening Current   Cervical Cancer Screening Between the ages of 21-29, pap smear recommended once every 3 years  Between the ages of 33-67, can perform pap smear with HPV co-testing every 5 years     Recommendations may differ for women with a history of total hysterectomy, cervical cancer, or abnormal pap smears in past  Pap Smear: 10/17/2019    Screening Not Indicated   Hepatitis C Screening Once for adults born between 1945 and 1965  More frequently in patients at high risk for Hepatitis C Hep C Antibody: 06/18/2021    Screening Current   Diabetes Screening 1-2 times per year if you're at risk for diabetes or have pre-diabetes Fasting glucose: 84 mg/dL   A1C: No results in last 5 years    Screening Current   Cholesterol Screening Once every 5 years if you don't have a lipid disorder  May order more often based on risk factors  Lipid panel: 06/18/2021    Screening Current     Other Preventive Screenings Covered by Medicare:  1  Abdominal Aortic Aneurysm (AAA) Screening: covered once if your at risk  You're considered to be at risk if you have a family history of AAA  2  Lung Cancer Screening: covers low dose CT scan once per year if you meet all of the following conditions: (1) Age 50-69; (2) No signs or symptoms of lung cancer; (3) Current smoker or have quit smoking within the last 15 years; (4) You have a tobacco smoking history of at least 30 pack years (packs per day multiplied by number of years you smoked); (5) You get a written order from a healthcare provider  3  Glaucoma Screening: covered annually if you're considered high risk: (1) You have diabetes OR (2) Family history of glaucoma OR (3)  aged 48 and older OR (3)  American aged 72 and older  3  Osteoporosis Screening: covered every 2 years if you meet one of the following conditions: (1) You're estrogen deficient and at risk for osteoporosis based off medical history and other findings; (2) Have a vertebral abnormality; (3) On glucocorticoid therapy for more than 3 months; (4) Have primary hyperparathyroidism; (5) On osteoporosis medications and need to assess response to drug therapy  · Last bone density test (DXA Scan): Not on file  5  HIV Screening: covered annually if you're between the age of 12-76  Also covered annually if you are younger than 13 and older than 72 with risk factors for HIV infection   For pregnant patients, it is covered up to 3 times per pregnancy  Immunizations:  Immunization Recommendations   Influenza Vaccine Annual influenza vaccination during flu season is recommended for all persons aged >= 6 months who do not have contraindications   Pneumococcal Vaccine (Prevnar and Pneumovax)  * Prevnar = PCV13  * Pneumovax = PPSV23   Adults 25-60 years old: 1-3 doses may be recommended based on certain risk factors  Adults 72 years old: Prevnar (PCV13) vaccine recommended followed by Pneumovax (PPSV23) vaccine  If already received PPSV23 since turning 65, then PCV13 recommended at least one year after PPSV23 dose  Hepatitis B Vaccine 3 dose series if at intermediate or high risk (ex: diabetes, end stage renal disease, liver disease)   Tetanus (Td) Vaccine - COST NOT COVERED BY MEDICARE PART B Following completion of primary series, a booster dose should be given every 10 years to maintain immunity against tetanus  Td may also be given as tetanus wound prophylaxis  Tdap Vaccine - COST NOT COVERED BY MEDICARE PART B Recommended at least once for all adults  For pregnant patients, recommended with each pregnancy  Shingles Vaccine (Shingrix) - COST NOT COVERED BY MEDICARE PART B  2 shot series recommended in those aged 48 and above     Health Maintenance Due:      Topic Date Due    Colorectal Cancer Screening  Never done    MAMMOGRAM  12/30/2021    Hepatitis C Screening  Completed     Immunizations Due:      Topic Date Due    Pneumococcal Vaccine: 65+ Years (2 of 2 - PPSV23) 02/19/2021    Influenza Vaccine (Season Ended) 09/01/2021     Advance Directives   What are advance directives? Advance directives are legal documents that state your wishes and plans for medical care  These plans are made ahead of time in case you lose your ability to make decisions for yourself  Advance directives can apply to any medical decision, such as the treatments you want, and if you want to donate organs  What are the types of advance directives?   There are many types of advance directives, and each state has rules about how to use them  You may choose a combination of any of the following:  · Living will: This is a written record of the treatment you want  You can also choose which treatments you do not want, which to limit, and which to stop at a certain time  This includes surgery, medicine, IV fluid, and tube feedings  · Durable power of  for healthcare [facility]): This is a written record that states who you want to make healthcare choices for you when you are unable to make them for yourself  This person, called a proxy, is usually a family member or a friend  You may choose more than 1 proxy  · Do not resuscitate (DNR) order:  A DNR order is used in case your heart stops beating or you stop breathing  It is a request not to have certain forms of treatment, such as CPR  A DNR order may be included in other types of advance directives  · Medical directive: This covers the care that you want if you are in a coma, near death, or unable to make decisions for yourself  You can list the treatments you want for each condition  Treatment may include pain medicine, surgery, blood transfusions, dialysis, IV or tube feedings, and a ventilator (breathing machine)  · Values history: This document has questions about your views, beliefs, and how you feel and think about life  This information can help others choose the care that you would choose  Why are advance directives important? An advance directive helps you control your care  Although spoken wishes may be used, it is better to have your wishes written down  Spoken wishes can be misunderstood, or not followed  Treatments may be given even if you do not want them  An advance directive may make it easier for your family to make difficult choices about your care  Urinary Incontinence   Urinary incontinence (UI)  is when you lose control of your bladder   UI develops because your bladder cannot store or empty urine properly  The 3 most common types of UI are stress incontinence, urge incontinence, or both  Medicines:   · May be given to help strengthen your bladder control  Report any side effects of medication to your healthcare provider  Do pelvic muscle exercises often:  Your pelvic muscles help you stop urinating  Squeeze these muscles tight for 5 seconds, then relax for 5 seconds  Gradually work up to squeezing for 10 seconds  Do 3 sets of 15 repetitions a day, or as directed  This will help strengthen your pelvic muscles and improve bladder control  Train your bladder:  Go to the bathroom at set times, such as every 2 hours, even if you do not feel the urge to go  You can also try to hold your urine when you feel the urge to go  For example, hold your urine for 5 minutes when you feel the urge to go  As that becomes easier, hold your urine for 10 minutes  Self-care:   · Keep a UI record  Write down how often you leak urine and how much you leak  Make a note of what you were doing when you leaked urine  · Drink liquids as directed  You may need to limit the amount of liquid you drink to help control your urine leakage  Do not drink any liquid right before you go to bed  Limit or do not have drinks that contain caffeine or alcohol  · Prevent constipation  Eat a variety of high-fiber foods  Good examples are high-fiber cereals, beans, vegetables, and whole-grain breads  Walking is the best way to trigger your intestines to have a bowel movement  · Exercise regularly and maintain a healthy weight  Weight loss and exercise will decrease pressure on your bladder and help you control your leakage  · Use a catheter as directed  to help empty your bladder  A catheter is a tiny, plastic tube that is put into your bladder to drain your urine  · Go to behavior therapy as directed  Behavior therapy may be used to help you learn to control your urge to urinate      Weight Management   Why it is important to manage your weight:  Being overweight increases your risk of health conditions such as heart disease, high blood pressure, type 2 diabetes, and certain types of cancer  It can also increase your risk for osteoarthritis, sleep apnea, and other respiratory problems  Aim for a slow, steady weight loss  Even a small amount of weight loss can lower your risk of health problems  How to lose weight safely:  A safe and healthy way to lose weight is to eat fewer calories and get regular exercise  You can lose up about 1 pound a week by decreasing the number of calories you eat by 500 calories each day  Healthy meal plan for weight management:  A healthy meal plan includes a variety of foods, contains fewer calories, and helps you stay healthy  A healthy meal plan includes the following:  · Eat whole-grain foods more often  A healthy meal plan should contain fiber  Fiber is the part of grains, fruits, and vegetables that is not broken down by your body  Whole-grain foods are healthy and provide extra fiber in your diet  Some examples of whole-grain foods are whole-wheat breads and pastas, oatmeal, brown rice, and bulgur  · Eat a variety of vegetables every day  Include dark, leafy greens such as spinach, kale, alona greens, and mustard greens  Eat yellow and orange vegetables such as carrots, sweet potatoes, and winter squash  · Eat a variety of fruits every day  Choose fresh or canned fruit (canned in its own juice or light syrup) instead of juice  Fruit juice has very little or no fiber  · Eat low-fat dairy foods  Drink fat-free (skim) milk or 1% milk  Eat fat-free yogurt and low-fat cottage cheese  Try low-fat cheeses such as mozzarella and other reduced-fat cheeses  · Choose meat and other protein foods that are low in fat  Choose beans or other legumes such as split peas or lentils  Choose fish, skinless poultry (chicken or turkey), or lean cuts of red meat (beef or pork)   Before you cook meat or poultry, cut off any visible fat  · Use less fat and oil  Try baking foods instead of frying them  Add less fat, such as margarine, sour cream, regular salad dressing and mayonnaise to foods  Eat fewer high-fat foods  Some examples of high-fat foods include french fries, doughnuts, ice cream, and cakes  · Eat fewer sweets  Limit foods and drinks that are high in sugar  This includes candy, cookies, regular soda, and sweetened drinks  Exercise:  Exercise at least 30 minutes per day on most days of the week  Some examples of exercise include walking, biking, dancing, and swimming  You can also fit in more physical activity by taking the stairs instead of the elevator or parking farther away from stores  Ask your healthcare provider about the best exercise plan for you  © Copyright [employer] 2018 Information is for End User's use only and may not be sold, redistributed or otherwise used for commercial purposes   All illustrations and images included in CareNotes® are the copyrighted property of [employer]  or [address] [place] [address]

## 2021-06-30 NOTE — PROGRESS NOTES
Assessment and Plan:     Problem List Items Addressed This Visit     None      Visit Diagnoses     Medicare annual wellness visit, subsequent    -  Primary     questionnaire reviewed  Immunization health screening history updated  Advance directive in place  BMI Counseling: Body mass index is 29 3 kg/m²  The BMI is above normal  Nutrition recommendations include decreasing portion sizes, encouraging healthy choices of fruits and vegetables, consuming healthier snacks, limiting drinks that contain sugar, moderation in carbohydrate intake and increasing intake of lean protein  Exercise recommendations include exercising 3-5 times per week  No pharmacotherapy was ordered  Preventive health issues were discussed with patient, and age appropriate screening tests were ordered as noted in patient's After Visit Summary  Personalized health advice and appropriate referrals for health education or preventive services given if needed, as noted in patient's After Visit Summary  History of Present Illness:     Patient presents for Welcome to Medicare visit       Patient Care Team:  Liliam Kruger DO as PCP - General     Review of Systems:     Review of Systems   Problem List:     Patient Active Problem List   Diagnosis    Left foot drop    Memory loss    Migraine    Multiple sclerosis ([facility] [address] )    Neurogenic bladder    Postmenopausal atrophic vaginitis    Reactive depression (situational)    Vitamin D deficiency    Welcome to Medicare preventive visit      Past Medical and Surgical History:     Past Medical History:   Diagnosis Date    Multiple sclerosis ([address] )     Vaginal Pap smear 10/11/2017    WNL     Past Surgical History:   Procedure Laterality Date    BLADDER SURGERY      KNEE SURGERY Right     & Left knee     OOPHORECTOMY Bilateral     age 61   Mavis Cousin TOTAL ABDOMINAL HYSTERECTOMY      pt believes cervix still present , age 61      Family History:     Family History   Problem Relation Age of Onset    Osteoarthritis Mother     Ulcers Father     Lung cancer Father     Hypertension Brother     No Known Problems Sister     No Known Problems Daughter     No Known Problems Maternal Grandmother     No Known Problems Maternal Grandfather     No Known Problems Paternal Grandmother     No Known Problems Paternal Grandfather     No Known Problems Brother     No Known Problems Maternal Aunt     Breast cancer Maternal Aunt 48    No Known Problems Maternal Aunt     No Known Problems Maternal Aunt     No Known Problems Paternal Aunt     No Known Problems Paternal Aunt       Social History:     Social History     Socioeconomic History    Marital status: /Civil Union     Spouse name: None    Number of children: None    Years of education: None    Highest education level: None   Occupational History    None   Tobacco Use    Smoking status: Never Smoker    Smokeless tobacco: Never Used   Substance and Sexual Activity    Alcohol use: Yes     Comment: occasional    Drug use: No    Sexual activity: Not Currently   Other Topics Concern    None   Social History Narrative    None     Social Determinants of Health     Financial Resource Strain:     Difficulty of Paying Living Expenses:    Food Insecurity:     Worried About Running Out of Food in the Last Year:     Ran Out of Food in the Last Year:    Transportation Needs:     Lack of Transportation (Medical):      Lack of Transportation (Non-Medical):    Physical Activity:     Days of Exercise per Week:     Minutes of Exercise per Session:    Stress:     Feeling of Stress :    Social Connections:     Frequency of Communication with Friends and Family:     Frequency of Social Gatherings with Friends and Family:     Attends Alevism Services:     Active Member of Clubs or Organizations:     Attends Club or Organization Meetings:     Marital Status:    Intimate Partner Violence:     Fear of Current or Ex-Partner:     Emotionally Abused:     Physically Abused:     Sexually Abused:       Medications and Allergies:     Current Outpatient Medications   Medication Sig Dispense Refill    acyclovir (ZOVIRAX) 5 % ointment Apply 5 % topically      Ascorbic Acid (VITAMIN C, ACEROLA,) 500 MG tablet Take 500 mg by mouth      baclofen 10 mg tablet May increase up to 4 times per day      calcium carbonate 1250 MG capsule Take 500 mg by mouth      Cholecalciferol 5000 units capsule Take 10,000 Units by mouth      cyanocobalamin (VITAMIN B-12) 100 mcg tablet 1 cap po daily      ferrous sulfate 325 (65 Fe) mg tablet daily      gabapentin (NEURONTIN) 300 mg capsule Take 300 mg by mouth      Glucosamine-Chondroitin 750-600 MG TABS Take by mouth      ibuprofen (MOTRIN) 800 mg tablet Take 800 mg by mouth      Magnesium 100 MG CAPS Take by mouth      naproxen (NAPROSYN) 375 mg tablet ONE PO TID PRN PAIN      naratriptan (AMERGE) 2 5 MG tablet Take 2 5 mg by mouth      senna (SENOKOT TO GO) 8 6 MG tablet Take by mouth      Vitamin E Acetate LIQD daily      tolterodine (DETROL LA) 4 mg 24 hr capsule Take 1 capsule (4 mg total) by mouth daily (Patient not taking: Reported on 6/30/2021) 90 capsule 4     No current facility-administered medications for this visit       Allergies   Allergen Reactions    Penicillins Rash      Immunizations:     Immunization History   Administered Date(s) Administered    Hep B, adult 04/02/2018    INFLUENZA 12/12/2014, 11/01/2018    Influenza, high dose seasonal 0 7 mL 02/17/2020    Influenza, seasonal, injectable 12/03/2014    Pneumococcal Conjugate 13-Valent 02/19/2020    SARS-CoV-2 / COVID-19 03/05/2021    SARS-CoV-2 / COVID-19 mRNA IM (Pfizer-BioNTech) 03/05/2021, 03/29/2021    Tdap 06/19/2006, 10/11/2013      Health Maintenance:         Topic Date Due    Colorectal Cancer Screening  Never done    MAMMOGRAM  12/30/2021    Hepatitis C Screening  Completed         Topic Date Due    Pneumococcal Vaccine: 65+ Years (2 of 2 - PPSV23) 02/19/2021    Influenza Vaccine (Season Ended) 09/01/2021      Medicare Screening Tests and Risk Assessments:     Crispin Cool is here for her Subsequent Wellness visit  Health Risk Assessment:   Patient rates overall health as very good  Patient feels that their physical health rating is same  Patient is very satisfied with their life  Eyesight was rated as same  Hearing was rated as same  Patient feels that their emotional and mental health rating is same  Patients states they are sometimes angry  Patient states they are sometimes unusually tired/fatigued  Pain experienced in the last 7 days has been some  Patient's pain rating has been 7/10  Patient states that she has experienced no weight loss or gain in last 6 months  Depression Screening:   PHQ-2 Score: 1  PHQ-9 Score: 3      Fall Risk Screening: In the past year, patient has experienced: no history of falling in past year      Urinary Incontinence Screening:   Patient has leaked urine accidently in the last six months  Home Safety:  Patient has trouble with stairs inside or outside of their home  Patient has working smoke alarms and has working carbon monoxide detector  Home safety hazards include: none  Nutrition:   Current diet is Low Carb and Regular  Medications:   Patient is currently taking over-the-counter supplements  OTC medications include: see medication list  Patient is able to manage medications  Activities of Daily Living (ADLs)/Instrumental Activities of Daily Living (IADLs):   Walk and transfer into and out of bed and chair?: Yes  Dress and groom yourself?: Yes    Bathe or shower yourself?: Yes    Feed yourself?  Yes  Do your laundry/housekeeping?: Yes  Manage your money, pay your bills and track your expenses?: Yes  Make your own meals?: Yes    Do your own shopping?: Yes    Previous Hospitalizations:   Any hospitalizations or ED visits within the last 12 months?: No      Advance Care Planning:   Living will: No    Advanced directive: No    Advanced directive counseling given: Yes    Five wishes given: Yes      Cognitive Screening:   Provider or family/friend/caregiver concerned regarding cognition?: No    PREVENTIVE SCREENINGS      Cardiovascular Screening:    General: Screening Current      Diabetes Screening:     General: Screening Current      Colorectal Cancer Screening:       Due for: Colonoscopy - Low Risk      Breast Cancer Screening:     General: Screening Current      Cervical Cancer Screening:    General: Screening Not Indicated      Osteoporosis Screening:    General: Risks and Benefits Discussed      Abdominal Aortic Aneurysm (AAA) Screening:        General: Screening Not Indicated      Lung Cancer Screening:     General: Screening Not Indicated      Hepatitis C Screening:    General: Screening Current    Screening, Brief Intervention, and Referral to Treatment (SBIRT)    Screening  Typical number of drinks in a day: 0  Typical number of drinks in a week: 1  Interpretation: Low risk drinking behavior  Single Item Drug Screening:  How often have you used an illegal drug (including marijuana) or a prescription medication for non-medical reasons in the past year? never    Single Item Drug Screen Score: 0  Interpretation: Negative screen for possible drug use disorder    Brief Intervention  Alcohol & drug use screenings were reviewed  No concerns regarding substance use disorder identified       No exam data present     Physical Exam:     /80 (BP Location: Right arm, Patient Position: Sitting, Cuff Size: Standard)   Pulse 78   Temp 98 °F (36 7 °C) (Temporal)   Ht 5' 7 2" (1 707 m)   Wt 85 4 kg (188 lb 3 2 oz)   SpO2 96%   BMI 29 30 kg/m²     Physical Exam     Malachy Anger, DO

## 2021-07-28 ENCOUNTER — EVALUATION (OUTPATIENT)
Dept: PHYSICAL THERAPY | Facility: MEDICAL CENTER | Age: 66
End: 2021-07-28
Payer: MEDICARE

## 2021-07-28 DIAGNOSIS — M25.551 RIGHT HIP PAIN: Primary | ICD-10-CM

## 2021-07-28 PROCEDURE — 97110 THERAPEUTIC EXERCISES: CPT | Performed by: PHYSICAL THERAPIST

## 2021-07-28 PROCEDURE — 97161 PT EVAL LOW COMPLEX 20 MIN: CPT | Performed by: PHYSICAL THERAPIST

## 2021-07-28 NOTE — PROGRESS NOTES
PT Evaluation     Today's date: 2021  Patient name: Varun Cabello  : 1955  MRN: [id number]  Referring provider: Nicola Bravo DO  Dx:   Encounter Diagnosis   Name Primary?  Right hip pain                   Assessment  Assessment details: Varun Cabello is a 79 y/o female who presents with complaints of R hip pain  The patients greatest concerns are pain with walking and daily activities  Primary movement impairment diagnosis of R hip hypomobility, tissue extensibility deficits, and hip accessory weakness, resulting in pathoanatomical symptoms of right hip pain, which limits her ability to perform functional activities without pain  Pt  will benefit from skilled PT services that includes manual therapy techniques to enhance tissue extensibility, neuromuscular re-education to facilitate motor control, therapeutic exercise to increase functional mobility, and modalities prn to reduce pain and inflammation    Impairments: abnormal gait, abnormal muscle firing, abnormal or restricted ROM, activity intolerance, impaired physical strength, pain with function, weight-bearing intolerance and poor posture   Understanding of Dx/Px/POC: good   Prognosis: good    Goals  Impairment Goals  - Pt I with initial HEP in 1-2 visits  - Improve ROM equal to contralateral side in 4-6 weeks  - Increase strength to 4/5 in all affected areas in 4-6 weeks    Functional Goals  - Increase Functional Status Measure to: goal status in 6-8 weeks  - Patient will be independent with comprehensive HEP in 6-8 weeks  - Ambulation is improved to prior level of function in 6-8 weeks  - Patient to report ability to perform all daily activities without c/o hip pain in 6-8 weeks    Plan  Patient would benefit from: PT eval  Planned modality interventions: thermotherapy: hydrocollator packs  Planned therapy interventions: joint mobilization, manual therapy, neuromuscular re-education, patient education, strengthening, stretching, therapeutic exercise, home exercise program, graded exercise, flexibility, body mechanics training and abdominal trunk stabilization  Frequency: 2x week  Duration in weeks: 8  Treatment plan discussed with: patient      Subjective Evaluation    History of Present Illness  Mechanism of injury: Patient reports that she noticed R hip pain ~ 1 5 months ago  She does not recall a specific DEX  She notes pain in her low back and b/l hips when walking  Pain is most bothersome in the morning  No radiating pain into the R LE  Patient occasionally has pain in the L LE  She has been walking with a cane for many years secondary to Multiple Sclerosis  Patient would like to decrease pain for ease c/ functional activities  Pain  Current pain ratin  At best pain ratin  At worst pain ratin  Location: R posterolateral hip and low back on the right  Quality: dull ache  Relieving factors: ice (Naproxen)      Diagnostic Tests  X-ray: normal  Treatments  Current treatment: physical therapy  Patient Goals  Patient goals for therapy: increased motion, increased strength, decreased pain and independence with ADLs/IADLs        Objective     Palpation     Right   Hypertonic in the piriformis and TFL  Tenderness of the piriformis, quadratus lumborum and TFL  Tenderness     Right Hip   Tenderness in the greater trochanter  No tenderness in the PSIS and sacroiliac joint  Lumbar Screen  Lumbar range of motion within normal limits with the following exceptions:Unable to assess lumbar ROM in standing secondary to balance dysfunction    Neurological Testing     Sensation     Hip   Left Hip   Intact: light touch    Right Hip   Intact: light touch    Additional Neurological Details  Reflexes NT      Active Range of Motion   Left Hip   Flexion: 110 degrees   Abduction: 35 degrees   External rotation (prone): 40 degrees   Internal rotation (prone): 35 degrees     Right Hip   Flexion: 100 degrees   Abduction: 30 degrees   External rotation (prone): 35 degrees   Internal rotation (prone): 25 degrees     Passive Range of Motion   Left Hip   Flexion: 115 degrees   Abduction: 35 degrees   External rotation (prone): 40 degrees   Internal rotation (prone): 35 degrees     Right Hip   Flexion: 110 degrees   Abduction: 35 degrees   External rotation (prone): 40 degrees   Internal rotation (prone): 30 degrees     Joint Play     Right Hip     Hypomobile in the posterior hip capsule, anterior hip capsule, lateral hip capsule and long axis distraction    Strength/Myotome Testing     Left Hip   Planes of Motion   Flexion: 3+  Extension: 2-  Abduction: 2-  Adduction: 2-    Isolated Muscles   Gluteus medius: 2-    Right Hip   Planes of Motion   Flexion: 3-  Extension: 3-  Abduction: 3-  Adduction: 3-    Isolated Muscles   Gluteus medius: 3-    Tests     Right Hip   Positive Ely's, DONTE and Mulugeta  Negative RADHA and adriane  Modified Colby: Positive  90/90 SLR: Positive  Ambulation   Weight-Bearing Status   Weight-Bearing Status (Right): weight-bearing as tolerated    Assistive device used: single point cane    Ambulation: Level Surfaces   Ambulation with assistive device: independent    Observational Gait   Gait: asymmetric   Decreased walking speed          Precautions:  Multiple sclerosis; left foot drop    Manuals 7/28            R hip PROM             R hip mobs             STM/MFR             UPAs L4-S1 b/l             Neuro Re-Ed             TA isometrics 20x5s                                      Ther Ex             LTR 20x5s            SKTC 10x 10s            Sup piriformis str 3x30s                                                                             Ther Activity                                       Gait Training                                       Modalities             KAYLEY Vinson, PT  7/28/2021,5:21 PM

## 2021-08-10 ENCOUNTER — OFFICE VISIT (OUTPATIENT)
Dept: PHYSICAL THERAPY | Facility: MEDICAL CENTER | Age: 66
End: 2021-08-10
Payer: MEDICARE

## 2021-08-10 DIAGNOSIS — M25.551 RIGHT HIP PAIN: Primary | ICD-10-CM

## 2021-08-10 PROCEDURE — 97140 MANUAL THERAPY 1/> REGIONS: CPT | Performed by: PHYSICAL THERAPIST

## 2021-08-10 PROCEDURE — 97110 THERAPEUTIC EXERCISES: CPT | Performed by: PHYSICAL THERAPIST

## 2021-08-10 NOTE — PROGRESS NOTES
Daily Note     Today's date: 8/10/2021  Patient name: Laura Gage  : 1955  MRN: [id number]  Referring provider: Raquel Berman DO  Dx:   Encounter Diagnosis     ICD-10-CM    1  Right hip pain  M25 551                   Subjective:  Patient states that she is feeling better  Objective: See treatment diary below  Assessment:  Patient presents c/ moderate soft tissue restrictions t/o R TFL and ITB  She did well c/ exercise progressions  Tolerated treatment well  Patient would benefit from continued PT  Plan: Continue per plan of care        Precautions:  Multiple sclerosis; left foot drop    Manuals 7/28 8/10           R hip PROM  AZ           R hip mobs  AZ           STM/MFR  AZ           UPAs L4-S1 b/l  AZ           Neuro Re-Ed             TA isometrics 20x5s 20x5s                                     Ther Ex             LTR 20x5s 20x5s           SKTC 10x 10s 10x 10s           Sup piriformis str 3x30s 3x30s           Clams  3x10 5s           SL hip abd  3x10                                                  Ther Activity                                       Gait Training                                       Modalities             MH

## 2021-08-12 ENCOUNTER — OFFICE VISIT (OUTPATIENT)
Dept: PHYSICAL THERAPY | Facility: MEDICAL CENTER | Age: 66
End: 2021-08-12
Payer: MEDICARE

## 2021-08-12 DIAGNOSIS — M25.551 RIGHT HIP PAIN: Primary | ICD-10-CM

## 2021-08-12 PROCEDURE — 97140 MANUAL THERAPY 1/> REGIONS: CPT | Performed by: PHYSICAL THERAPIST

## 2021-08-12 PROCEDURE — 97110 THERAPEUTIC EXERCISES: CPT | Performed by: PHYSICAL THERAPIST

## 2021-08-12 NOTE — PROGRESS NOTES
Daily Note     Today's date: 2021  Patient name: Abe Presley  : 1955  MRN: [id number]  Referring provider: Michelle Cook DO  Dx:   Encounter Diagnosis     ICD-10-CM    1  Right hip pain  M25 551                   Subjective:  Patient states that she feels good, but a little sore today  Objective: See treatment diary below  Assessment:  Patient presents c/ R TFL and hip flexor tightness  She has some mild pain in the anterior hip c/ ambulation today  Patient is doing well c/ HEP  Tolerated treatment well  Patient would benefit from continued PT  Plan: Continue per plan of care        Precautions:  Multiple sclerosis; left foot drop    Manuals 7/28 8/10 8/12          R hip PROM  AZ AZ          R hip mobs  AZ AZ          STM/MFR  AZ AZ          UPAs L4-S1 b/l  AZ AZ          SL Hip flexor str   AZ          Neuro Re-Ed             TA isometrics 20x5s 20x5s 20x5s                                    Ther Ex             LTR 20x5s 20x5s 20x5s          SKTC 10x 10s 10x 10s 10x 10s          Sup piriformis str 3x30s 3x30s 3x30s          Clams  3x10 5s 3x10 5s          SL hip abd  3x10 3x10          Hip add   20x5s                                    Ther Activity                                       Gait Training                                       Modalities

## 2021-08-17 ENCOUNTER — OFFICE VISIT (OUTPATIENT)
Dept: PHYSICAL THERAPY | Facility: MEDICAL CENTER | Age: 66
End: 2021-08-17
Payer: MEDICARE

## 2021-08-17 DIAGNOSIS — M25.551 RIGHT HIP PAIN: Primary | ICD-10-CM

## 2021-08-17 PROCEDURE — 97140 MANUAL THERAPY 1/> REGIONS: CPT | Performed by: PHYSICAL THERAPIST

## 2021-08-17 PROCEDURE — 97110 THERAPEUTIC EXERCISES: CPT | Performed by: PHYSICAL THERAPIST

## 2021-08-17 NOTE — PROGRESS NOTES
Daily Note     Today's date: 2021  Patient name: Tony Mccoy  : 1955  MRN: [id number]  Referring provider: Kandis De La O DO  Dx:   Encounter Diagnosis     ICD-10-CM    1  Right hip pain  M25 551                   Subjective:  Patient states that her R groin is sore today  Objective: See treatment diary below  Assessment:  Patient presents c/ TFL, ITB, and rectus femoris tightness  Patient responded well to STM/MFR and manual stretching  Tolerated treatment well  Patient would benefit from continued PT  Plan: Continue per plan of care        Precautions:  Multiple sclerosis; left foot drop    Manuals 7/28 8/10 8/12 8/17         R hip PROM  AZ AZ AZ         R hip mobs  AZ AZ AZ         STM/MFR  AZ AZ AZ         UPAs L4-S1 b/l  AZ AZ AZ         SL Hip flexor str   AZ AZ         Neuro Re-Ed             TA isometrics 20x5s 20x5s 20x5s 20x5s                                   Ther Ex             LTR 20x5s 20x5s 20x5s 20x5s         SKTC 10x 10s 10x 10s 10x 10s 10x 10s         Sup piriformis str 3x30s 3x30s 3x30s 3x30s         Clams  3x10 5s 3x10 5s 3x10 5s          SL hip abd  3x10 3x10 3x10         Hip add   20x5s 20x5s                                   Ther Activity                                       Gait Training                                       Modalities                 10'

## 2021-08-19 ENCOUNTER — OFFICE VISIT (OUTPATIENT)
Dept: PHYSICAL THERAPY | Facility: MEDICAL CENTER | Age: 66
End: 2021-08-19
Payer: MEDICARE

## 2021-08-19 DIAGNOSIS — M25.551 RIGHT HIP PAIN: Primary | ICD-10-CM

## 2021-08-19 PROCEDURE — 97140 MANUAL THERAPY 1/> REGIONS: CPT | Performed by: PHYSICAL THERAPIST

## 2021-08-19 PROCEDURE — 97110 THERAPEUTIC EXERCISES: CPT | Performed by: PHYSICAL THERAPIST

## 2021-08-19 NOTE — PROGRESS NOTES
Daily Note     Today's date: 2021  Patient name: Shanna Tijerina  : 1955  MRN: [id number]  Referring provider: Heidy Menjivar DO  Dx:   Encounter Diagnosis     ICD-10-CM    1  Right hip pain  M25 551                   Subjective:  Patient states that she is doing well  Objective: See treatment diary below  Assessment:  Patient presents c/ decreased myofascial restrictions t/o R TFL  She is doing well c/ exercise progressions  Tolerated treatment well  Patient would benefit from continued PT  Plan: Continue per plan of care        Precautions:  Multiple sclerosis; left foot drop    Manuals 7/28 8/10 8/12 8/17 8/19        R hip PROM  AZ AZ AZ AZ        R hip mobs  AZ AZ AZ AZ        STM/MFR  AZ AZ AZ AZ        UPAs L4-S1 b/l  AZ AZ AZ AZ        SL Hip flexor str   AZ AZ         Neuro Re-Ed             TA isometrics 20x5s 20x5s 20x5s 20x5s 20x5s                                  Ther Ex             LTR 20x5s 20x5s 20x5s 20x5s 20x5s        SKTC 10x 10s 10x 10s 10x 10s 10x 10s 10x 10s        Sup piriformis str 3x30s 3x30s 3x30s 3x30s 3x30s        Clams  3x10 5s 3x10 5s 3x10 5s  3x10 5s        SL hip abd  3x10 3x10 3x10 3x10        Hip add   20x5s 20x5s 30x5s                                  Ther Activity                                       Gait Training                                       Modalities                 10' 5'

## 2021-08-31 ENCOUNTER — OFFICE VISIT (OUTPATIENT)
Dept: PHYSICAL THERAPY | Facility: MEDICAL CENTER | Age: 66
End: 2021-08-31
Payer: MEDICARE

## 2021-08-31 DIAGNOSIS — M25.551 RIGHT HIP PAIN: Primary | ICD-10-CM

## 2021-08-31 PROCEDURE — 97110 THERAPEUTIC EXERCISES: CPT | Performed by: PHYSICAL THERAPIST

## 2021-08-31 NOTE — PROGRESS NOTES
Daily Note     Today's date: 2021  Patient name: Britta Jenkins  : 1955  MRN: [id number]  Referring provider: Yoshi Rudd DO  Dx:   Encounter Diagnosis     ICD-10-CM    1  Right hip pain  M25 551                   Subjective:  Patient states that she is doing well  Objective: See treatment diary below  Assessment:  Patient presents c/ mild tightness/tenderness t/o R TFL and hip flexor region  She is doing well c/ exercise progressions  Tolerated treatment well  Patient would benefit from continued PT  Plan: Continue per plan of care        Precautions:  Multiple sclerosis; left foot drop    Manuals 7/28 8/10 8/12 8/17 8/19 8/31       R hip PROM  AZ AZ AZ AZ        R hip mobs  AZ AZ AZ AZ AZ       STM/MFR  AZ AZ AZ AZ AZ       UPAs L4-S1 b/l  AZ AZ AZ AZ        SL Hip flexor str   AZ AZ  AZ       Neuro Re-Ed             TA isometrics 20x5s 20x5s 20x5s 20x5s 20x5s 20x5s                                 Ther Ex             LTR 20x5s 20x5s 20x5s 20x5s 20x5s 20x5s       SKTC 10x 10s 10x 10s 10x 10s 10x 10s 10x 10s 10x 10s       Sup piriformis str 3x30s 3x30s 3x30s 3x30s 3x30s 3x30s       Clams  3x10 5s 3x10 5s 3x10 5s  3x10 5s 3x12 5s       SL hip abd  3x10 3x10 3x10 3x10 3x12       Hip add   20x5s 20x5s 30x5s 30x5s       Hip abd      [id number]                    Ther Activity                                       Gait Training                                       Modalities                 10' 5'

## 2021-09-02 ENCOUNTER — OFFICE VISIT (OUTPATIENT)
Dept: PHYSICAL THERAPY | Facility: MEDICAL CENTER | Age: 66
End: 2021-09-02
Payer: MEDICARE

## 2021-09-02 DIAGNOSIS — M25.551 RIGHT HIP PAIN: Primary | ICD-10-CM

## 2021-09-02 PROCEDURE — 97140 MANUAL THERAPY 1/> REGIONS: CPT | Performed by: PHYSICAL THERAPIST

## 2021-09-02 PROCEDURE — 97110 THERAPEUTIC EXERCISES: CPT | Performed by: PHYSICAL THERAPIST

## 2021-09-02 NOTE — PROGRESS NOTES
Daily Note     Today's date: 2021  Patient name: Abe Presley  : 1955  MRN: [id number]  Referring provider: Michelle Cook DO  Dx:   Encounter Diagnosis     ICD-10-CM    1  Right hip pain  M25 551                   Subjective:  Patient states that she is doing well  Objective: See treatment diary below  Assessment:  Patient presents c/ R TFL and ITB tightness  She felt great after lv  Patient continues to make progress c/ posterior chain strength  Tolerated treatment well  Patient would benefit from continued PT  Plan: Continue per plan of care        Precautions:  Multiple sclerosis; left foot drop    Manuals 7/28 8/10 8/12 8/17 8/19 8/31 9/2      R hip PROM  AZ AZ AZ AZ        R hip mobs  AZ AZ AZ AZ AZ AZ      STM/MFR  AZ AZ AZ AZ AZ AZ      UPAs L4-S1 b/l  AZ AZ AZ AZ        SL Hip flexor str   AZ AZ  AZ AZ      Neuro Re-Ed             TA isometrics 20x5s 20x5s 20x5s 20x5s 20x5s 20x5s 20x5s                                Ther Ex             LTR 20x5s 20x5s 20x5s 20x5s 20x5s 20x5s 20x5s      SKTC 10x 10s 10x 10s 10x 10s 10x 10s 10x 10s 10x 10s 10x 10s      Sup piriformis str 3x30s 3x30s 3x30s 3x30s 3x30s 3x30s 3x30s      Clams  3x10 5s 3x10 5s 3x10 5s  3x10 5s 3x12 5s 3x15 5s      SL hip abd  3x10 3x10 3x10 3x10 3x12 3x15      Hip add   20x5s 20x5s 30x5s 30x5s 30x5s      Hip abd      20x5s GR 20x5s GR      Bridges                          Ther Activity                                       Gait Training                                       Modalities                 10' 5' 5' 5'

## 2021-09-09 ENCOUNTER — OFFICE VISIT (OUTPATIENT)
Dept: PHYSICAL THERAPY | Facility: MEDICAL CENTER | Age: 66
End: 2021-09-09
Payer: MEDICARE

## 2021-09-09 DIAGNOSIS — M25.551 RIGHT HIP PAIN: Primary | ICD-10-CM

## 2021-09-09 PROCEDURE — 97110 THERAPEUTIC EXERCISES: CPT | Performed by: PHYSICAL THERAPIST

## 2021-09-09 PROCEDURE — 97140 MANUAL THERAPY 1/> REGIONS: CPT | Performed by: PHYSICAL THERAPIST

## 2021-09-09 NOTE — PROGRESS NOTES
Daily Note     Today's date: 2021  Patient name: Valerio Kingsley  : 1955  MRN: [id number]  Referring provider: Jason Ghosh DO  Dx:   Encounter Diagnosis     ICD-10-CM    1  Right hip pain  M25 551                   Subjective:  Patient states that she is doing well  Objective: See treatment diary below  Assessment:  Patient presents c/ tightness t/o R TFL and ITB d/t long car ride yesterday  She has been performing HEP and is doing well c/ decreased R hip pain  Tolerated treatment well  Patient would benefit from continued PT  Plan: Continue per plan of care        Precautions:  Multiple sclerosis; left foot drop    Manuals 7/28 8/10 8/12 8/17 8/19 8/31 9/2 9/9     R hip PROM  AZ AZ AZ AZ        R hip mobs  AZ AZ AZ AZ AZ AZ AZ     STM/MFR  AZ AZ AZ AZ AZ AZ AZ     UPAs L4-S1 b/l  AZ AZ AZ AZ        SL Hip flexor str   AZ AZ  AZ AZ AZ     Neuro Re-Ed             TA isometrics 20x5s 20x5s 20x5s 20x5s 20x5s 20x5s 20x5s 20x5s                               Ther Ex             LTR 20x5s 20x5s 20x5s 20x5s 20x5s 20x5s 20x5s 20x5s     SKTC 10x 10s 10x 10s 10x 10s 10x 10s 10x 10s 10x 10s 10x 10s 10x 10s     Sup piriformis str 3x30s 3x30s 3x30s 3x30s 3x30s 3x30s 3x30s 3x30s     Clams  3x10 5s 3x10 5s 3x10 5s  3x10 5s 3x12 5s 3x15 5s 3x15 5s     SL hip abd  3x10 3x10 3x10 3x10 3x12 3x15 3x15     Hip add   20x5s 20x5s 30x5s 30x5s 30x5s 30x5s     Hip abd      20x5s GR 20x5s GR [id number]     Bridges        2x10                  Ther Activity                                       Gait Training                                       Modalities                 10' 5' 5' 5 5'

## 2021-09-13 ENCOUNTER — OFFICE VISIT (OUTPATIENT)
Dept: PHYSICAL THERAPY | Facility: MEDICAL CENTER | Age: 66
End: 2021-09-13
Payer: MEDICARE

## 2021-09-13 DIAGNOSIS — M25.551 RIGHT HIP PAIN: Primary | ICD-10-CM

## 2021-09-13 PROCEDURE — 97140 MANUAL THERAPY 1/> REGIONS: CPT | Performed by: PHYSICAL THERAPIST

## 2021-09-13 PROCEDURE — 97110 THERAPEUTIC EXERCISES: CPT | Performed by: PHYSICAL THERAPIST

## 2021-09-13 NOTE — PROGRESS NOTES
Daily Note     Today's date: 2021  Patient name: Valdez Jaime  : 1955  MRN: [id number]  Referring provider: Waynette Primrose, DO  Dx:   Encounter Diagnosis     ICD-10-CM    1  Right hip pain  M25 551                   Subjective:  Patient states that she is doing well  Objective: See treatment diary below  Assessment:  Patient demonstrates good tolerance to exercise progressions  She has moderate TFL and ITB tightness on the R  Tolerated treatment well  Patient would benefit from continued PT  Plan: Continue per plan of care        Precautions:  Multiple sclerosis; left foot drop    Manuals 7/28 8/10 8/12 8/17 8/19 8/31 9/2 9/9 9/13    R hip PROM  AZ AZ AZ AZ        R hip mobs  AZ AZ AZ AZ AZ AZ AZ AZ    STM/MFR  AZ AZ AZ AZ AZ AZ AZ AZ    UPAs L4-S1 b/l  AZ AZ AZ AZ        SL Hip flexor str   AZ AZ  AZ AZ AZ AZ    Neuro Re-Ed             TA isometrics 20x5s 20x5s 20x5s 20x5s 20x5s 20x5s 20x5s 20x5s 20x5s                              Ther Ex             LTR 20x5s 20x5s 20x5s 20x5s 20x5s 20x5s 20x5s 20x5s 20x5s    SKTC 10x 10s 10x 10s 10x 10s 10x 10s 10x 10s 10x 10s 10x 10s 10x 10s 10x 10s    Sup piriformis str 3x30s 3x30s 3x30s 3x30s 3x30s 3x30s 3x30s 3x30s 3x30s    Clams  3x10 5s 3x10 5s 3x10 5s  3x10 5s 3x12 5s 3x15 5s 3x15 5s 3x15 5s    SL hip abd  3x10 3x10 3x10 3x10 3x12 3x15 3x15 3x15    Hip add   20x5s 20x5s 30x5s 30x5s 30x5s 30x5s 30x5s    Hip abd      20x5s GR 20x5s GR [id number]    Bridges        2x10 3x10                 Ther Activity                                       Gait Training                                       Modalities                 10' 5' 5' 5' 5 5'

## 2021-09-15 ENCOUNTER — OFFICE VISIT (OUTPATIENT)
Dept: PHYSICAL THERAPY | Facility: MEDICAL CENTER | Age: 66
End: 2021-09-15
Payer: MEDICARE

## 2021-09-15 DIAGNOSIS — M25.551 RIGHT HIP PAIN: Primary | ICD-10-CM

## 2021-09-15 PROCEDURE — 97110 THERAPEUTIC EXERCISES: CPT | Performed by: PHYSICAL THERAPIST

## 2021-09-15 PROCEDURE — 97140 MANUAL THERAPY 1/> REGIONS: CPT | Performed by: PHYSICAL THERAPIST

## 2021-09-15 NOTE — PROGRESS NOTES
Daily Note     Today's date: 9/15/2021  Patient name: Ally Walker  : 1955  MRN: [id number]  Referring provider: Leta Bruce DO  Dx:   Encounter Diagnosis     ICD-10-CM    1  Right hip pain  M25 551                   Subjective:  Patient states that she is doing well  Objective: See treatment diary below  Assessment:  Patient presents with overall decreased pain from 8/10 to 2/10 on a regular basis  Patient demonstrates mild TFL and ITB tightness  She continues to demonstrate increased strength and good tolerance to exercise progressions  Tolerated treatment well  Patient would benefit from continued PT      Plan: Continue per plan of care        Precautions:  Multiple sclerosis; left foot drop    Manuals 7/28 8/10 8/12 8/17 8/19 8/31 9/2 9/9 9/13 9/15   R hip PROM  AZ AZ AZ AZ        R hip mobs  AZ AZ AZ AZ AZ AZ AZ AZ AZ   STM/MFR  AZ AZ AZ AZ AZ AZ AZ AZ AZ   UPAs L4-S1 b/l  AZ AZ AZ AZ        SL Hip flexor str   AZ AZ  AZ AZ AZ AZ AZ   Neuro Re-Ed             TA isometrics 20x5s 20x5s 20x5s 20x5s 20x5s 20x5s 20x5s 20x5s 20x5s 20x5s                             Ther Ex             LTR 20x5s 20x5s 20x5s 20x5s 20x5s 20x5s 20x5s 20x5s 20x5s 20x5s   SKTC 10x 10s 10x 10s 10x 10s 10x 10s 10x 10s 10x 10s 10x 10s 10x 10s 10x 10s 10x 10s   Sup piriformis str 3x30s 3x30s 3x30s 3x30s 3x30s 3x30s 3x30s 3x30s 3x30s 3x30s   Clams  3x10 5s 3x10 5s 3x10 5s  3x10 5s 3x12 5s 3x15 5s 3x15 5s 3x15 5s 3x15   SL hip abd  3x10 3x10 3x10 3x10 3x12 3x15 3x15 3x15 3x15   Hip add   20x5s 20x5s 30x5s 30x5s 30x5s 30x5s 30x5s 30x5s   Hip abd      20x5s GR 20x5s GR [id number] 30x5s blue   Bridges        2x10 3x10 3x10                Ther Activity                                       Gait Training                                       Modalities             MH    10' 5' 5' 5' 5' 5' 5'

## 2021-09-22 ENCOUNTER — OFFICE VISIT (OUTPATIENT)
Dept: PHYSICAL THERAPY | Facility: MEDICAL CENTER | Age: 66
End: 2021-09-22
Payer: MEDICARE

## 2021-09-22 DIAGNOSIS — M25.551 RIGHT HIP PAIN: Primary | ICD-10-CM

## 2021-09-22 PROCEDURE — 97110 THERAPEUTIC EXERCISES: CPT | Performed by: PHYSICAL THERAPIST

## 2021-09-22 PROCEDURE — 97140 MANUAL THERAPY 1/> REGIONS: CPT | Performed by: PHYSICAL THERAPIST

## 2021-09-22 NOTE — PROGRESS NOTES
Daily Note     Today's date: 2021  Patient name: Varun Cabello  : 1955  MRN: [id number]  Referring provider: Nicola Bravo DO  Dx:   Encounter Diagnosis     ICD-10-CM    1  Right hip pain  M25 551                   Subjective:  Patient states that she is doing well  Objective: See treatment diary below  Assessment:  Patient presents c/ decreased soft tissue restrictions t/o R TFL and ITB  Tolerated treatment well  Patient would benefit from continued PT  Plan: Continue per plan of care        Precautions:  Multiple sclerosis; left foot drop    Manuals 9/22 8/10 8/12 8/17 8/19 8/31 9/2 9/9 9/13 9/15   R hip PROM AZ AZ AZ AZ AZ        R hip mobs AZ AZ AZ AZ AZ AZ AZ AZ AZ AZ   STM/MFR AZ AZ AZ AZ AZ AZ AZ AZ AZ AZ   UPAs L4-S1 b/l  AZ AZ AZ AZ        SL Hip flexor str   AZ AZ  AZ AZ AZ AZ AZ   Neuro Re-Ed             TA isometrics 20x5s 20x5s 20x5s 20x5s 20x5s 20x5s 20x5s 20x5s 20x5s 20x5s                             Ther Ex             LTR 20x5s 20x5s 20x5s 20x5s 20x5s 20x5s 20x5s 20x5s 20x5s 20x5s   SKTC 10x 10s 10x 10s 10x 10s 10x 10s 10x 10s 10x 10s 10x 10s 10x 10s 10x 10s 10x 10s   Sup piriformis str 3x30s 3x30s 3x30s 3x30s 3x30s 3x30s 3x30s 3x30s 3x30s 3x30s   Clams 3x10 5s iso 3x10 5s 3x10 5s 3x10 5s  3x10 5s 3x12 5s 3x15 5s 3x15 5s 3x15 5s 3x15   SL hip abd 3x10 5s iso 3x10 3x10 3x10 3x10 3x12 3x15 3x15 3x15 3x15   Hip add 30x5s  20x5s 20x5s 30x5s 30x5s 30x5s 30x5s 30x5s 30x5s   Hip abd 30x5s blue     20x5s GR 20x5s GR [id number] 30x5s blue   Bridges 3x10       2x10 3x10 3x10                Ther Activity                                       Gait Training                                       Modalities              10'   10' 5' 5' 5' 5' 5 5'

## 2021-09-28 ENCOUNTER — OFFICE VISIT (OUTPATIENT)
Dept: PHYSICAL THERAPY | Facility: MEDICAL CENTER | Age: 66
End: 2021-09-28
Payer: MEDICARE

## 2021-09-28 DIAGNOSIS — M25.551 RIGHT HIP PAIN: Primary | ICD-10-CM

## 2021-09-28 PROCEDURE — 97140 MANUAL THERAPY 1/> REGIONS: CPT | Performed by: PHYSICAL THERAPIST

## 2021-09-28 NOTE — PROGRESS NOTES
Daily Note     Today's date: 2021  Patient name: Susan Pedro  : 1955  MRN: [id number]  Referring provider: John Duggan DO  Dx:   Encounter Diagnosis     ICD-10-CM    1  Right hip pain  M25 551                   Subjective:  Patient states that she is sore today d/t possibly the weather  Objective: See treatment diary below  Assessment:  Tanner Wilson has been compliant with attending PT and home exercise program since initial eval   Marybelle Exon  has made improvements in objective data since initial evalulation  Patient provided with updated Home Exercise Program, all questions answered, verbalized understanding and agreement to plan of care  Thus it was mutually decided to discontinue this episode of care and transition to Home Exercise Program     Plan:  D/c to HEP       Precautions:  Multiple sclerosis; left foot drop    Manuals            R hip PROM AZ AZ           R hip mobs AZ AZ           STM/MFR AZ AZ           UPAs L4-S1 b/l             SL Hip flexor str             Neuro Re-Ed             TA isometrics 20x5s                                      Ther Ex             LTR 20x5s            SKTC 10x 10s            Sup piriformis str 3x30s            Clams 3x10 5s iso            SL hip abd 3x10 5s iso            Hip add 30x5s            Hip abd 30x5s blue            Bridges 3x10                         Ther Activity                                       Gait Training                                       Modalities              10'

## 2021-09-29 ENCOUNTER — APPOINTMENT (OUTPATIENT)
Dept: PHYSICAL THERAPY | Facility: MEDICAL CENTER | Age: 66
End: 2021-09-29
Payer: MEDICARE

## 2021-10-29 ENCOUNTER — OFFICE VISIT (OUTPATIENT)
Dept: OBGYN CLINIC | Facility: MEDICAL CENTER | Age: 66
End: 2021-10-29
Payer: MEDICARE

## 2021-10-29 VITALS
SYSTOLIC BLOOD PRESSURE: 120 MMHG | WEIGHT: 192 LBS | BODY MASS INDEX: 30.86 KG/M2 | HEIGHT: 66 IN | DIASTOLIC BLOOD PRESSURE: 80 MMHG

## 2021-10-29 DIAGNOSIS — Z12.31 ENCOUNTER FOR SCREENING MAMMOGRAM FOR MALIGNANT NEOPLASM OF BREAST: ICD-10-CM

## 2021-10-29 DIAGNOSIS — Z01.419 WOMEN'S ANNUAL ROUTINE GYNECOLOGICAL EXAMINATION: Primary | ICD-10-CM

## 2021-10-29 DIAGNOSIS — Z13.820 ENCOUNTER FOR OSTEOPOROSIS SCREENING IN ASYMPTOMATIC POSTMENOPAUSAL PATIENT: ICD-10-CM

## 2021-10-29 DIAGNOSIS — Z78.0 ENCOUNTER FOR OSTEOPOROSIS SCREENING IN ASYMPTOMATIC POSTMENOPAUSAL PATIENT: ICD-10-CM

## 2021-10-29 PROCEDURE — G0101 CA SCREEN;PELVIC/BREAST EXAM: HCPCS | Performed by: OBSTETRICS & GYNECOLOGY

## 2022-04-25 NOTE — TELEPHONE ENCOUNTER
Blood work orders placed for [facility] [employer]   Can be printed and mailed to patient 
Called pt to schedule over due awv  Pt needs bw orders placed for [facility]     Please advise  Mail orders to pt 
Mailed bw orders 
1.93

## 2022-05-25 ENCOUNTER — HOSPITAL ENCOUNTER (OUTPATIENT)
Dept: BONE DENSITY | Facility: MEDICAL CENTER | Age: 67
Discharge: HOME | End: 2022-05-25
Payer: MEDICARE

## 2022-05-25 ENCOUNTER — HOSPITAL ENCOUNTER (OUTPATIENT)
Dept: MAMMOGRAPHY | Facility: MEDICAL CENTER | Age: 67
Discharge: HOME | End: 2022-05-25
Payer: MEDICARE

## 2022-05-25 VITALS — BODY MASS INDEX: 29.25 KG/M2 | WEIGHT: 182 LBS | HEIGHT: 66 IN

## 2022-05-25 DIAGNOSIS — Z12.31 ENCOUNTER FOR SCREENING MAMMOGRAM FOR MALIGNANT NEOPLASM OF BREAST: ICD-10-CM

## 2022-05-25 DIAGNOSIS — Z78.0 ENCOUNTER FOR OSTEOPOROSIS SCREENING IN ASYMPTOMATIC POSTMENOPAUSAL PATIENT: ICD-10-CM

## 2022-05-25 DIAGNOSIS — Z13.820 ENCOUNTER FOR OSTEOPOROSIS SCREENING IN ASYMPTOMATIC POSTMENOPAUSAL PATIENT: ICD-10-CM

## 2022-05-25 PROCEDURE — 77063 BREAST TOMOSYNTHESIS BI: CPT

## 2022-05-25 PROCEDURE — 77067 SCR MAMMO BI INCL CAD: CPT

## 2022-05-25 PROCEDURE — 77080 DXA BONE DENSITY AXIAL: CPT

## 2022-08-08 ENCOUNTER — RA CDI HCC (OUTPATIENT)
Dept: OTHER | Facility: HOSPITAL | Age: 67
End: 2022-08-08

## 2022-08-08 NOTE — PROGRESS NOTES
[address]  coding opportunities       Chart reviewed, no opportunity found: CHART REVIEWED, NO OPPORTUNITY FOUND        Patients Insurance     Medicare Insurance: Medicare

## 2022-08-17 ENCOUNTER — OFFICE VISIT (OUTPATIENT)
Dept: FAMILY MEDICINE CLINIC | Facility: CLINIC | Age: 67
End: 2022-08-17
Payer: MEDICARE

## 2022-08-17 VITALS
TEMPERATURE: 98 F | OXYGEN SATURATION: 97 % | DIASTOLIC BLOOD PRESSURE: 82 MMHG | WEIGHT: 190 LBS | SYSTOLIC BLOOD PRESSURE: 140 MMHG | HEART RATE: 74 BPM | HEIGHT: 66 IN | BODY MASS INDEX: 30.53 KG/M2

## 2022-08-17 DIAGNOSIS — Z00.00 MEDICARE ANNUAL WELLNESS VISIT, SUBSEQUENT: Primary | ICD-10-CM

## 2022-08-17 DIAGNOSIS — Z13.1 SCREENING FOR DIABETES MELLITUS: ICD-10-CM

## 2022-08-17 DIAGNOSIS — Z13.29 SCREENING FOR THYROID DISORDER: ICD-10-CM

## 2022-08-17 DIAGNOSIS — Z13.0 SCREENING FOR DEFICIENCY ANEMIA: ICD-10-CM

## 2022-08-17 DIAGNOSIS — M81.0 AGE-RELATED OSTEOPOROSIS WITHOUT CURRENT PATHOLOGICAL FRACTURE: ICD-10-CM

## 2022-08-17 DIAGNOSIS — Z13.6 SCREENING FOR CARDIOVASCULAR CONDITION: ICD-10-CM

## 2022-08-17 DIAGNOSIS — G35 MULTIPLE SCLEROSIS (HCC): ICD-10-CM

## 2022-08-17 PROCEDURE — G0439 PPPS, SUBSEQ VISIT: HCPCS | Performed by: FAMILY MEDICINE

## 2022-08-17 NOTE — PROGRESS NOTES
Assessment and Plan:     Overall patient doing well  No significant concerns at this time  Will update relevant screening blood work and other testing  Anticipatory guidance provided  Recheck in 1 year or sooner as needed  Problem List Items Addressed This Visit     Multiple sclerosis ([facility] [address] )      Other Visit Diagnoses     Medicare annual wellness visit, subsequent    -  Primary    Questionnaire reviewed  Immunization health screening history updated  Advance directive not in place but counseling materials including 5 wishes provided  Screening for deficiency anemia        Relevant Orders    CBC and differential    Screening for diabetes mellitus        Relevant Orders    Comprehensive metabolic panel    Screening for cardiovascular condition        Relevant Orders    Lipid Panel with Direct LDL reflex    Screening for thyroid disorder        Relevant Orders    TSH, 3rd generation    Age-related osteoporosis without current pathological fracture        Relevant Orders    Vitamin D 25 hydroxy        BMI Counseling: Body mass index is 30 67 kg/m²  The BMI is above normal  Nutrition recommendations include encouraging healthy choices of fruits and vegetables, decreasing fast food intake, moderation in carbohydrate intake and increasing intake of lean protein  Exercise recommendations include exercising 3-5 times per week  No pharmacotherapy was ordered  Rationale for BMI follow-up plan is due to patient being overweight or obese  Urinary Incontinence Plan of Care: counseling topics discussed: limit alcohol, caffeine, spicy foods, and acidic foods, limiting fluid intake 3-4 hours before bed and limiting fluid intake to 60 oz  per day  Preventive health issues were discussed with patient, and age appropriate screening tests were ordered as noted in patient's After Visit Summary    Personalized health advice and appropriate referrals for health education or preventive services given if needed, as noted in patient's After Visit Summary  History of Present Illness:     Patient presents for a Medicare Wellness Visit    Patient presents for annual wellness visit and routine follow-up  Overall she is doing well  Her only ongoing diagnosis is multiple sclerosis which appears to be stable  She does follow with Neurology but there has been no progression of her disease or disability  Patient Care Team:  Hallie Vernon DO as PCP - General     Review of Systems:     Review of Systems   Constitutional: Negative  HENT: Negative  Negative for congestion, ear pain, hearing loss, nosebleeds, sore throat and trouble swallowing  Eyes: Negative  Respiratory: Negative for apnea, cough, chest tightness, shortness of breath and wheezing  Cardiovascular: Negative  Gastrointestinal: Negative for abdominal pain, blood in stool, constipation, diarrhea, nausea and vomiting  Endocrine: Negative  Genitourinary: Negative for difficulty urinating, dysuria, frequency, hematuria and urgency  Musculoskeletal: Positive for gait problem  Negative for arthralgias, joint swelling and myalgias  Skin: Negative for rash  Neurological: Negative for dizziness, syncope, light-headedness, numbness and headaches  Hematological: Negative  Psychiatric/Behavioral: Negative for confusion, dysphoric mood and sleep disturbance  The patient is not nervous/anxious           Problem List:     Patient Active Problem List   Diagnosis    Left foot drop    Memory loss    Migraine    Multiple sclerosis ([address] )    Neurogenic bladder    Postmenopausal atrophic vaginitis    Reactive depression (situational)    Vitamin D deficiency    Welcome to Medicare preventive visit      Past Medical and Surgical History:     Past Medical History:   Diagnosis Date    Multiple sclerosis ([address] )     Vaginal Pap smear 10/11/2017    WNL     Past Surgical History:   Procedure Laterality Date    BLADDER SURGERY      bladder lift    COLONOSCOPY  09/2004    normal    HYSTERECTOMY      HYSTERECTOMY W/ SALPINGO-OOPHERECTOMY  08/2012    laproscopic    KNEE SURGERY Right     & Left knee     OOPHORECTOMY Bilateral     age 61    REPLACEMENT TOTAL KNEE Right       Family History:     Family History   Problem Relation Age of Onset    Osteoarthritis Mother     Ulcers Father     Lung cancer Father     Hypertension Brother     No Known Problems Sister     No Known Problems Daughter     No Known Problems Maternal Grandmother     No Known Problems Maternal Grandfather     No Known Problems Paternal Grandmother     No Known Problems Paternal Grandfather     No Known Problems Brother     No Known Problems Maternal Aunt     Breast cancer Maternal Aunt 48    No Known Problems Maternal Aunt     No Known Problems Maternal Aunt     No Known Problems Paternal Aunt     No Known Problems Paternal Aunt       Social History:     Social History     Socioeconomic History    Marital status: /Civil Union     Spouse name: None    Number of children: None    Years of education: None    Highest education level: None   Occupational History    None   Tobacco Use    Smoking status: Never Smoker    Smokeless tobacco: Never Used   Vaping Use    Vaping Use: None   Substance and Sexual Activity    Alcohol use: Yes     Comment: occasionally    Drug use: No    Sexual activity: Not Currently   Other Topics Concern    None   Social History Narrative    None     Social Determinants of Health     Financial Resource Strain: Not on file   Food Insecurity: Not on file   Transportation Needs: Not on file   Physical Activity: Not on file   Stress: Not on file   Social Connections: Not on file   Intimate Partner Violence: Not on file   Housing Stability: Not on file      Medications and Allergies:     Current Outpatient Medications   Medication Sig Dispense Refill    Ascorbic Acid (VITAMIN C, ACEROLA,) 500 MG tablet Take 500 mg by mouth      calcium carbonate 1250 MG capsule Take 500 mg by mouth      Cholecalciferol 5000 units capsule Take 10,000 Units by mouth      cyanocobalamin (VITAMIN B-12) 100 mcg tablet 1 cap po daily      ferrous sulfate 325 (65 Fe) mg tablet daily      gabapentin (NEURONTIN) 300 mg capsule Take 300 mg by mouth      Glucosamine-Chondroitin 750-600 MG TABS Take by mouth      ibuprofen (MOTRIN) 800 mg tablet Take 800 mg by mouth      Magnesium 100 MG CAPS Take by mouth      naproxen (NAPROSYN) 375 mg tablet ONE PO TID PRN PAIN      naratriptan (AMERGE) 2 5 MG tablet Take 2 5 mg by mouth      NON FORMULARY Take 1 Units by mouth daily      Omega-3 Fatty Acids (FISH OIL PO) Take by mouth      senna (SENOKOT) 8 6 MG tablet Take by mouth      TURMERIC PO Take by mouth      valACYclovir (VALTREX) 1,000 mg tablet Take 1 tablet (1,000 mg total) by mouth 2 (two) times a day for 15 days Take for 5 days for cold sores 10 tablet 2    Vitamin E Acetate LIQD daily      APPLE CIDER VINEGAR PO Take by mouth (Patient not taking: Reported on 8/17/2022)       No current facility-administered medications for this visit       Allergies   Allergen Reactions    Penicillins Rash      Immunizations:     Immunization History   Administered Date(s) Administered    COVID-19 PFIZER VACCINE 0 3 ML IM 03/05/2021, 03/29/2021, 10/06/2021    COVID-19, unspecified 03/05/2021    Hep B, adult 04/02/2018    INFLUENZA 12/12/2014, 11/01/2018    Influenza, high dose seasonal 0 7 mL 02/17/2020    Influenza, seasonal, injectable 12/03/2014    Pneumococcal Conjugate 13-Valent 02/19/2020    Pneumococcal Polysaccharide PPV23 06/30/2021    Tdap 06/19/2006, 10/11/2013      Health Maintenance:         Topic Date Due    Breast Cancer Screening: Mammogram  05/25/2023    Colorectal Cancer Screening  09/24/2031    Hepatitis C Screening  Completed         Topic Date Due    COVID-19 Vaccine (4 - Booster) 02/06/2022    Influenza Vaccine (1) 09/01/2022 Medicare Screening Tests and Risk Assessments:     Crispin Cool is here for her Subsequent Wellness visit  Health Risk Assessment:   Patient rates overall health as very good  Patient feels that their physical health rating is same  Patient is satisfied with their life  Eyesight was rated as same  Hearing was rated as same  Patient feels that their emotional and mental health rating is same  Patients states they are sometimes angry  Patient states they are sometimes unusually tired/fatigued  Pain experienced in the last 7 days has been some  Patient's pain rating has been 4/10  Patient states that she has experienced no weight loss or gain in last 6 months  Depression Screening:   PHQ-9 Score: 0      Fall Risk Screening: In the past year, patient has experienced: history of falling in past year    Number of falls: 1  Injured during fall?: No    Feels unsteady when standing or walking?: Yes    Worried about falling?: Yes      Urinary Incontinence Screening:   Patient has leaked urine accidently in the last six months  Home Safety:  Patient has trouble with stairs inside or outside of their home  Patient has working smoke alarms and has working carbon monoxide detector  Home safety hazards include: none  Nutrition:   Current diet is Regular  Medications:   Patient is currently taking over-the-counter supplements  OTC medications include: see medication list  Patient is able to manage medications  Activities of Daily Living (ADLs)/Instrumental Activities of Daily Living (IADLs):   Walk and transfer into and out of bed and chair?: Yes  Dress and groom yourself?: Yes    Bathe or shower yourself?: Yes    Feed yourself?  Yes  Do your laundry/housekeeping?: Yes  Manage your money, pay your bills and track your expenses?: Yes  Make your own meals?: Yes    Do your own shopping?: Yes    Previous Hospitalizations:   Any hospitalizations or ED visits within the last 12 months?: No      Advance Care Planning: Living will: No    Durable POA for healthcare: No    Five wishes given: Yes      Cognitive Screening:   Provider or family/friend/caregiver concerned regarding cognition?: No    PREVENTIVE SCREENINGS      Cardiovascular Screening:    General: Screening Current      Colorectal Cancer Screening:     General: Screening Current      Breast Cancer Screening:     General: Screening Current      Cervical Cancer Screening:    General: Screening Not Indicated      Osteoporosis Screening:    General: Screening Current      Abdominal Aortic Aneurysm (AAA) Screening:        General: Screening Not Indicated      Lung Cancer Screening:     General: Screening Not Indicated      Hepatitis C Screening:    General: Screening Current    Screening, Brief Intervention, and Referral to Treatment (SBIRT)    Screening  Typical number of drinks in a day: 0  Typical number of drinks in a week: 0  Interpretation: Low risk drinking behavior  Single Item Drug Screening:  How often have you used an illegal drug (including marijuana) or a prescription medication for non-medical reasons in the past year? never    Single Item Drug Screen Score: 0  Interpretation: Negative screen for possible drug use disorder    Brief Intervention  Alcohol & drug use screenings were reviewed  No concerns regarding substance use disorder identified  No exam data present     Physical Exam:     /82 (BP Location: Left arm, Patient Position: Sitting, Cuff Size: Large)   Pulse 74   Temp 98 °F (36 7 °C) (Temporal)   Ht 5' 6" (1 676 m)   Wt 86 2 kg (190 lb)   LMP  (LMP Unknown)   SpO2 97%   BMI 30 67 kg/m²     Physical Exam  Vitals and nursing note reviewed  Constitutional:       Appearance: She is well-developed  HENT:      Head: Normocephalic and atraumatic  Eyes:      Pupils: Pupils are equal, round, and reactive to light  Cardiovascular:      Rate and Rhythm: Normal rate and regular rhythm  Heart sounds: Normal heart sounds  Pulmonary:      Effort: Pulmonary effort is normal       Breath sounds: Normal breath sounds  Musculoskeletal:         General: Normal range of motion  Cervical back: Normal range of motion and neck supple  Skin:     General: Skin is warm and dry  Capillary Refill: Capillary refill takes less than 2 seconds  Neurological:      Mental Status: She is alert and oriented to person, place, and time  Psychiatric:         Behavior: Behavior normal          Thought Content:  Thought content normal          Judgment: Judgment normal           Laly Hatch DO

## 2022-08-17 NOTE — PATIENT INSTRUCTIONS
Medicare Preventive Visit Patient Instructions  Thank you for completing your Welcome to Medicare Visit or Medicare Annual Wellness Visit today  Your next wellness visit will be due in one year (8/18/2023)  The screening/preventive services that you may require over the next 5-10 years are detailed below  Some tests may not apply to you based off risk factors and/or age  Screening tests ordered at today's visit but not completed yet may show as past due  Also, please note that scanned in results may not display below  Preventive Screenings:  Service Recommendations Previous Testing/Comments   Colorectal Cancer Screening  * Colonoscopy    * Fecal Occult Blood Test (FOBT)/Fecal Immunochemical Test (FIT)  * Fecal DNA/Cologuard Test  * Flexible Sigmoidoscopy Age: 39-70 years old   Colonoscopy: every 10 years (may be performed more frequently if at higher risk)  OR  FOBT/FIT: every 1 year  OR  Cologuard: every 3 years  OR  Sigmoidoscopy: every 5 years  Screening may be recommended earlier than age 39 if at higher risk for colorectal cancer  Also, an individualized decision between you and your healthcare provider will decide whether screening between the ages of 74-80 would be appropriate  Colonoscopy: 09/24/2021  FOBT/FIT: Not on file  Cologuard: Not on file  Sigmoidoscopy: Not on file    Screening Current     Breast Cancer Screening Age: 36 years old  Frequency: every 1-2 years  Not required if history of left and right mastectomy Mammogram: 05/25/2022    Screening Current   Cervical Cancer Screening Between the ages of 21-29, pap smear recommended once every 3 years  Between the ages of 33-67, can perform pap smear with HPV co-testing every 5 years     Recommendations may differ for women with a history of total hysterectomy, cervical cancer, or abnormal pap smears in past  Pap Smear: 10/29/2021    Screening Not Indicated   Hepatitis C Screening Once for adults born between 1945 and 1965  More frequently in patients at high risk for Hepatitis C Hep C Antibody: 06/18/2021    Screening Current   Diabetes Screening 1-2 times per year if you're at risk for diabetes or have pre-diabetes Fasting glucose: 84 mg/dL (6/18/2021)  A1C: No results in last 5 years (No results in last 5 years)      Cholesterol Screening Once every 5 years if you don't have a lipid disorder  May order more often based on risk factors  Lipid panel: 06/18/2021    Screening Current     Other Preventive Screenings Covered by Medicare:  1  Abdominal Aortic Aneurysm (AAA) Screening: covered once if your at risk  You're considered to be at risk if you have a family history of AAA  2  Lung Cancer Screening: covers low dose CT scan once per year if you meet all of the following conditions: (1) Age 50-69; (2) No signs or symptoms of lung cancer; (3) Current smoker or have quit smoking within the last 15 years; (4) You have a tobacco smoking history of at least 20 pack years (packs per day multiplied by number of years you smoked); (5) You get a written order from a healthcare provider  3  Glaucoma Screening: covered annually if you're considered high risk: (1) You have diabetes OR (2) Family history of glaucoma OR (3)  aged 48 and older OR (3)  American aged 72 and older  3  Osteoporosis Screening: covered every 2 years if you meet one of the following conditions: (1) You're estrogen deficient and at risk for osteoporosis based off medical history and other findings; (2) Have a vertebral abnormality; (3) On glucocorticoid therapy for more than 3 months; (4) Have primary hyperparathyroidism; (5) On osteoporosis medications and need to assess response to drug therapy  · Last bone density test (DXA Scan): 05/31/2022   5  HIV Screening: covered annually if you're between the age of 15-65  Also covered annually if you are younger than 13 and older than 72 with risk factors for HIV infection   For pregnant patients, it is covered up to 3 times per pregnancy  Immunizations:  Immunization Recommendations   Influenza Vaccine Annual influenza vaccination during flu season is recommended for all persons aged >= 6 months who do not have contraindications   Pneumococcal Vaccine   * Pneumococcal conjugate vaccine = PCV13 (Prevnar 13), PCV15 (Vaxneuvance), PCV20 (Prevnar 20)  * Pneumococcal polysaccharide vaccine = PPSV23 (Pneumovax) Adults 25-60 years old: 1-3 doses may be recommended based on certain risk factors  Adults 72 years old: 1-2 doses may be recommended based off what pneumonia vaccine you previously received   Hepatitis B Vaccine 3 dose series if at intermediate or high risk (ex: diabetes, end stage renal disease, liver disease)   Tetanus (Td) Vaccine - COST NOT COVERED BY MEDICARE PART B Following completion of primary series, a booster dose should be given every 10 years to maintain immunity against tetanus  Td may also be given as tetanus wound prophylaxis  Tdap Vaccine - COST NOT COVERED BY MEDICARE PART B Recommended at least once for all adults  For pregnant patients, recommended with each pregnancy  Shingles Vaccine (Shingrix) - COST NOT COVERED BY MEDICARE PART B  2 shot series recommended in those aged 48 and above     Health Maintenance Due:      Topic Date Due    Breast Cancer Screening: Mammogram  05/25/2023    Colorectal Cancer Screening  09/24/2031    Hepatitis C Screening  Completed     Immunizations Due:      Topic Date Due    COVID-19 Vaccine (4 - Booster) 02/06/2022    Influenza Vaccine (1) 09/01/2022     Advance Directives   What are advance directives? Advance directives are legal documents that state your wishes and plans for medical care  These plans are made ahead of time in case you lose your ability to make decisions for yourself  Advance directives can apply to any medical decision, such as the treatments you want, and if you want to donate organs  What are the types of advance directives?   There are many types of advance directives, and each state has rules about how to use them  You may choose a combination of any of the following:  · Living will: This is a written record of the treatment you want  You can also choose which treatments you do not want, which to limit, and which to stop at a certain time  This includes surgery, medicine, IV fluid, and tube feedings  · Durable power of  for healthcare [place] [facility]): This is a written record that states who you want to make healthcare choices for you when you are unable to make them for yourself  This person, called a proxy, is usually a family member or a friend  You may choose more than 1 proxy  · Do not resuscitate (DNR) order:  A DNR order is used in case your heart stops beating or you stop breathing  It is a request not to have certain forms of treatment, such as CPR  A DNR order may be included in other types of advance directives  · Medical directive: This covers the care that you want if you are in a coma, near death, or unable to make decisions for yourself  You can list the treatments you want for each condition  Treatment may include pain medicine, surgery, blood transfusions, dialysis, IV or tube feedings, and a ventilator (breathing machine)  · Values history: This document has questions about your views, beliefs, and how you feel and think about life  This information can help others choose the care that you would choose  Why are advance directives important? An advance directive helps you control your care  Although spoken wishes may be used, it is better to have your wishes written down  Spoken wishes can be misunderstood, or not followed  Treatments may be given even if you do not want them  An advance directive may make it easier for your family to make difficult choices about your care  Fall Prevention    Fall prevention  includes ways to make your home and other areas safer   It also includes ways you can move more carefully to prevent a fall  Health conditions that cause changes in your blood pressure, vision, or muscle strength and coordination may increase your risk for falls  Medicines may also increase your risk for falls if they make you dizzy, weak, or sleepy  Fall prevention tips:   · Stand or sit up slowly  · Use assistive devices as directed  · Wear shoes that fit well and have soles that   · Wear a personal alarm  · Stay active  · Manage your medical conditions  Home Safety Tips:  · Add items to prevent falls in the bathroom  · Keep paths clear  · Install bright lights in your home  · Keep items you use often on shelves within reach  · Paint or place reflective tape on the edges of your stairs  Urinary Incontinence   Urinary incontinence (UI)  is when you lose control of your bladder  UI develops because your bladder cannot store or empty urine properly  The 3 most common types of UI are stress incontinence, urge incontinence, or both  Medicines:   · May be given to help strengthen your bladder control  Report any side effects of medication to your healthcare provider  Do pelvic muscle exercises often:  Your pelvic muscles help you stop urinating  Squeeze these muscles tight for 5 seconds, then relax for 5 seconds  Gradually work up to squeezing for 10 seconds  Do 3 sets of 15 repetitions a day, or as directed  This will help strengthen your pelvic muscles and improve bladder control  Train your bladder:  Go to the bathroom at set times, such as every 2 hours, even if you do not feel the urge to go  You can also try to hold your urine when you feel the urge to go  For example, hold your urine for 5 minutes when you feel the urge to go  As that becomes easier, hold your urine for 10 minutes  Self-care:   · Keep a UI record  Write down how often you leak urine and how much you leak  Make a note of what you were doing when you leaked urine  · Drink liquids as directed   You may need to limit the amount of liquid you drink to help control your urine leakage  Do not drink any liquid right before you go to bed  Limit or do not have drinks that contain caffeine or alcohol  · Prevent constipation  Eat a variety of high-fiber foods  Good examples are high-fiber cereals, beans, vegetables, and whole-grain breads  Walking is the best way to trigger your intestines to have a bowel movement  · Exercise regularly and maintain a healthy weight  Weight loss and exercise will decrease pressure on your bladder and help you control your leakage  · Use a catheter as directed  to help empty your bladder  A catheter is a tiny, plastic tube that is put into your bladder to drain your urine  · Go to behavior therapy as directed  Behavior therapy may be used to help you learn to control your urge to urinate  Weight Management   Why it is important to manage your weight:  Being overweight increases your risk of health conditions such as heart disease, high blood pressure, type 2 diabetes, and certain types of cancer  It can also increase your risk for osteoarthritis, sleep apnea, and other respiratory problems  Aim for a slow, steady weight loss  Even a small amount of weight loss can lower your risk of health problems  How to lose weight safely:  A safe and healthy way to lose weight is to eat fewer calories and get regular exercise  You can lose up about 1 pound a week by decreasing the number of calories you eat by 500 calories each day  Healthy meal plan for weight management:  A healthy meal plan includes a variety of foods, contains fewer calories, and helps you stay healthy  A healthy meal plan includes the following:  · Eat whole-grain foods more often  A healthy meal plan should contain fiber  Fiber is the part of grains, fruits, and vegetables that is not broken down by your body  Whole-grain foods are healthy and provide extra fiber in your diet   Some examples of whole-grain foods are whole-wheat breads and pastas, oatmeal, brown rice, and bulgur  · Eat a variety of vegetables every day  Include dark, leafy greens such as spinach, kale, alona greens, and mustard greens  Eat yellow and orange vegetables such as carrots, sweet potatoes, and winter squash  · Eat a variety of fruits every day  Choose fresh or canned fruit (canned in its own juice or light syrup) instead of juice  Fruit juice has very little or no fiber  · Eat low-fat dairy foods  Drink fat-free (skim) milk or 1% milk  Eat fat-free yogurt and low-fat cottage cheese  Try low-fat cheeses such as mozzarella and other reduced-fat cheeses  · Choose meat and other protein foods that are low in fat  Choose beans or other legumes such as split peas or lentils  Choose fish, skinless poultry (chicken or turkey), or lean cuts of red meat (beef or pork)  Before you cook meat or poultry, cut off any visible fat  · Use less fat and oil  Try baking foods instead of frying them  Add less fat, such as margarine, sour cream, regular salad dressing and mayonnaise to foods  Eat fewer high-fat foods  Some examples of high-fat foods include french fries, doughnuts, ice cream, and cakes  · Eat fewer sweets  Limit foods and drinks that are high in sugar  This includes candy, cookies, regular soda, and sweetened drinks  Exercise:  Exercise at least 30 minutes per day on most days of the week  Some examples of exercise include walking, biking, dancing, and swimming  You can also fit in more physical activity by taking the stairs instead of the elevator or parking farther away from stores  Ask your healthcare provider about the best exercise plan for you  © Copyright [employer] 2018 Information is for End User's use only and may not be sold, redistributed or otherwise used for commercial purposes   All illustrations and images included in CareNotes® are the copyrighted property of A D A M , [employer]  or [address] [place] [address] [employer]

## 2023-08-14 DIAGNOSIS — Z12.31 ENCOUNTER FOR SCREENING MAMMOGRAM FOR MALIGNANT NEOPLASM OF BREAST: Primary | ICD-10-CM

## 2023-08-16 ENCOUNTER — APPOINTMENT (OUTPATIENT)
Dept: LAB | Facility: MEDICAL CENTER | Age: 68
End: 2023-08-16
Payer: MEDICARE

## 2023-08-16 ENCOUNTER — OFFICE VISIT (OUTPATIENT)
Dept: URGENT CARE | Facility: MEDICAL CENTER | Age: 68
End: 2023-08-16
Payer: MEDICARE

## 2023-08-16 VITALS
HEART RATE: 70 BPM | RESPIRATION RATE: 18 BRPM | DIASTOLIC BLOOD PRESSURE: 68 MMHG | SYSTOLIC BLOOD PRESSURE: 142 MMHG | TEMPERATURE: 97.1 F | OXYGEN SATURATION: 97 %

## 2023-08-16 DIAGNOSIS — M81.0 AGE-RELATED OSTEOPOROSIS WITHOUT CURRENT PATHOLOGICAL FRACTURE: ICD-10-CM

## 2023-08-16 DIAGNOSIS — S46.091A OTHER INJURY OF MUSCLE(S) AND TENDON(S) OF THE ROTATOR CUFF OF RIGHT SHOULDER, INITIAL ENCOUNTER: Primary | ICD-10-CM

## 2023-08-16 DIAGNOSIS — Z13.1 SCREENING FOR DIABETES MELLITUS: ICD-10-CM

## 2023-08-16 DIAGNOSIS — Z13.6 SCREENING FOR CARDIOVASCULAR CONDITION: ICD-10-CM

## 2023-08-16 DIAGNOSIS — G35 MS (MULTIPLE SCLEROSIS) (HCC): ICD-10-CM

## 2023-08-16 DIAGNOSIS — Z13.0 SCREENING FOR DEFICIENCY ANEMIA: ICD-10-CM

## 2023-08-16 DIAGNOSIS — Z13.29 SCREENING FOR THYROID DISORDER: ICD-10-CM

## 2023-08-16 LAB
25(OH)D3 SERPL-MCNC: 45.8 NG/ML (ref 30–100)
ALBUMIN SERPL BCP-MCNC: 3.6 G/DL (ref 3.5–5)
ALP SERPL-CCNC: 71 U/L (ref 46–116)
ALT SERPL W P-5'-P-CCNC: 23 U/L (ref 12–78)
ANION GAP SERPL CALCULATED.3IONS-SCNC: 2 MMOL/L
AST SERPL W P-5'-P-CCNC: 18 U/L (ref 5–45)
BASOPHILS # BLD AUTO: 0.02 THOUSANDS/ÂΜL (ref 0–0.1)
BASOPHILS NFR BLD AUTO: 0 % (ref 0–1)
BILIRUB SERPL-MCNC: 0.5 MG/DL (ref 0.2–1)
BUN SERPL-MCNC: 14 MG/DL (ref 5–25)
CALCIUM SERPL-MCNC: 9.3 MG/DL (ref 8.3–10.1)
CHLORIDE SERPL-SCNC: 108 MMOL/L (ref 96–108)
CHOLEST SERPL-MCNC: 156 MG/DL
CO2 SERPL-SCNC: 31 MMOL/L (ref 21–32)
CREAT SERPL-MCNC: 0.74 MG/DL (ref 0.6–1.3)
EOSINOPHIL # BLD AUTO: 0.13 THOUSAND/ÂΜL (ref 0–0.61)
EOSINOPHIL NFR BLD AUTO: 3 % (ref 0–6)
ERYTHROCYTE [DISTWIDTH] IN BLOOD BY AUTOMATED COUNT: 12.1 % (ref 11.6–15.1)
GFR SERPL CREATININE-BSD FRML MDRD: 82 ML/MIN/1.73SQ M
GLUCOSE P FAST SERPL-MCNC: 102 MG/DL (ref 65–99)
HCT VFR BLD AUTO: 43.4 % (ref 34.8–46.1)
HDLC SERPL-MCNC: 66 MG/DL
HGB BLD-MCNC: 14.6 G/DL (ref 11.5–15.4)
IMM GRANULOCYTES # BLD AUTO: 0.01 THOUSAND/UL (ref 0–0.2)
IMM GRANULOCYTES NFR BLD AUTO: 0 % (ref 0–2)
LDLC SERPL CALC-MCNC: 77 MG/DL (ref 0–100)
LYMPHOCYTES # BLD AUTO: 1.11 THOUSANDS/ÂΜL (ref 0.6–4.47)
LYMPHOCYTES NFR BLD AUTO: 25 % (ref 14–44)
MCH RBC QN AUTO: 31.1 PG (ref 26.8–34.3)
MCHC RBC AUTO-ENTMCNC: 33.6 G/DL (ref 31.4–37.4)
MCV RBC AUTO: 93 FL (ref 82–98)
MONOCYTES # BLD AUTO: 0.4 THOUSAND/ÂΜL (ref 0.17–1.22)
MONOCYTES NFR BLD AUTO: 9 % (ref 4–12)
NEUTROPHILS # BLD AUTO: 2.85 THOUSANDS/ÂΜL (ref 1.85–7.62)
NEUTS SEG NFR BLD AUTO: 63 % (ref 43–75)
NRBC BLD AUTO-RTO: 0 /100 WBCS
PLATELET # BLD AUTO: 286 THOUSANDS/UL (ref 149–390)
PMV BLD AUTO: 10.5 FL (ref 8.9–12.7)
POTASSIUM SERPL-SCNC: 4.5 MMOL/L (ref 3.5–5.3)
PROT SERPL-MCNC: 7.2 G/DL (ref 6.4–8.4)
RBC # BLD AUTO: 4.69 MILLION/UL (ref 3.81–5.12)
SODIUM SERPL-SCNC: 141 MMOL/L (ref 135–147)
TRIGL SERPL-MCNC: 67 MG/DL
TSH SERPL DL<=0.05 MIU/L-ACNC: 2.05 UIU/ML (ref 0.45–4.5)
WBC # BLD AUTO: 4.52 THOUSAND/UL (ref 4.31–10.16)

## 2023-08-16 PROCEDURE — 80061 LIPID PANEL: CPT

## 2023-08-16 PROCEDURE — 82306 VITAMIN D 25 HYDROXY: CPT

## 2023-08-16 PROCEDURE — 85025 COMPLETE CBC W/AUTO DIFF WBC: CPT

## 2023-08-16 PROCEDURE — G0463 HOSPITAL OUTPT CLINIC VISIT: HCPCS | Performed by: ORTHOPAEDIC SURGERY

## 2023-08-16 PROCEDURE — 80053 COMPREHEN METABOLIC PANEL: CPT

## 2023-08-16 PROCEDURE — 36415 COLL VENOUS BLD VENIPUNCTURE: CPT

## 2023-08-16 PROCEDURE — 99213 OFFICE O/P EST LOW 20 MIN: CPT | Performed by: ORTHOPAEDIC SURGERY

## 2023-08-16 PROCEDURE — 84443 ASSAY THYROID STIM HORMONE: CPT

## 2023-08-16 RX ORDER — PREDNISONE 10 MG/1
TABLET ORAL
Qty: 18 TABLET | Refills: 0 | Status: SHIPPED | OUTPATIENT
Start: 2023-08-16

## 2023-08-16 NOTE — PROGRESS NOTES
[facility]        NAME: Yoshi Issa is a 70 y.o. female  : 1955    MRN: [id number]  DATE: 2023  TIME: 10:20 AM    Assessment and Plan   Other injury of muscle(s) and tendon(s) of the rotator cuff of right shoulder, initial encounter [S46.091A]  1. Other injury of muscle(s) and tendon(s) of the rotator cuff of right shoulder, initial encounter  Ambulatory Referral to Physical Therapy    Ambulatory Referral to Orthopedic Surgery    predniSONE 10 mg tablet      2. MS (multiple sclerosis) ([facility])  predniSONE 10 mg tablet            Patient Instructions     Tylenol/naproxen, ice/heat, home exercises  Begin physical therapy  Follow-up with orthopedics  Follow up with PCP in 3-5 days. Proceed to  ER if symptoms worsen. Chief Complaint     Chief Complaint   Patient presents with   • right shoulder pain     Hurt her right shoulder about 10 days ago by throwing a ball to grand kids and then the next day did water aerobics. Has decreased ROM. Unable to lift it to front , side or over head. Has a hx of MS. Pain is a 4-5/10. Very uncomfortable to sleep. History of Present Illness       27-year-old female presents to urgent care for evaluation of acute right shoulder pain. She states approximate 10 days ago she was out with her grandchildren throwing ball when she developed cute onset of right shoulder pain. The patient does have a past medical history of MS, and notes that any kind of injury or illness tends to flare of her MS, so it has been difficult for her to walk since the shoulder pain started. When asked where the pain is located the patient points along the anterior aspect of the shoulder. She complains of significant difficulty with forward flexion and abduction, with increased weakness. The patient had seen her chiropractor, who gave her an adjustment, though this did not help much.   She is also been doing home exercises and using ice and naproxen for symptom relief. The patient did reach out to her MS doctor, leaving a message on the [place] [facility] portal, but she has not yet heard back. She does report a history of a right shoulder injury many many years ago when she fell, which did require use of a sling and physical therapy. She denies any history of fractures or surgeries or rotator cuff injuries that she knows of. Review of Systems   Review of Systems   Constitutional: Negative for chills and fever. HENT: Negative for ear pain and sore throat. Eyes: Negative for pain and visual disturbance. Respiratory: Negative for cough and shortness of breath. Cardiovascular: Negative for chest pain and palpitations. Gastrointestinal: Negative for abdominal pain and vomiting. Genitourinary: Negative for dysuria and hematuria. Musculoskeletal: Positive for arthralgias and gait problem. Negative for back pain and neck pain. Skin: Negative for color change and rash. Neurological: Negative for dizziness, seizures, syncope and headaches. All other systems reviewed and are negative.         Current Medications       Current Outpatient Medications:   •  Ascorbic Acid (VITAMIN C, ACEROLA,) 500 MG tablet, Take 500 mg by mouth, Disp: , Rfl:   •  calcium carbonate 1250 MG capsule, Take 500 mg by mouth, Disp: , Rfl:   •  Cholecalciferol 5000 units capsule, Take 10,000 Units by mouth, Disp: , Rfl:   •  cyanocobalamin (VITAMIN B-12) 100 mcg tablet, 1 cap po daily, Disp: , Rfl:   •  ferrous sulfate 325 (65 Fe) mg tablet, daily, Disp: , Rfl:   •  Glucosamine-Chondroitin 750-600 MG TABS, Take by mouth, Disp: , Rfl:   •  Magnesium 100 MG CAPS, Take by mouth, Disp: , Rfl:   •  Omega-3 Fatty Acids (FISH OIL PO), Take by mouth, Disp: , Rfl:   •  predniSONE 10 mg tablet, 4 x 3 days, 3 x 1, 2 x 1, 1 x 1, Disp: 18 tablet, Rfl: 0  •  TURMERIC PO, Take by mouth, Disp: , Rfl:   •  Vitamin E Acetate LIQD, daily, Disp: , Rfl:   •  APPLE CIDER VINEGAR PO, Take by mouth (Patient not taking: Reported on 8/17/2022), Disp: , Rfl:   •  gabapentin (NEURONTIN) 300 mg capsule, Take 300 mg by mouth (Patient not taking: Reported on 8/16/2023), Disp: , Rfl:   •  ibuprofen (MOTRIN) 800 mg tablet, Take 800 mg by mouth (Patient not taking: Reported on 8/16/2023), Disp: , Rfl:   •  naproxen (NAPROSYN) 375 mg tablet, ONE PO TID PRN PAIN, Disp: , Rfl:   •  naratriptan (AMERGE) 2.5 MG tablet, Take 2.5 mg by mouth (Patient not taking: Reported on 8/16/2023), Disp: , Rfl:   •  NON FORMULARY, Take 1 Units by mouth daily (Patient not taking: Reported on 8/16/2023), Disp: , Rfl:   •  senna (SENOKOT) 8.6 MG tablet, Take by mouth (Patient not taking: Reported on 8/16/2023), Disp: , Rfl:   •  valACYclovir (VALTREX) 1,000 mg tablet, Take 1 tablet (1,000 mg total) by mouth 2 (two) times a day for 15 days Take for 5 days for cold sores, Disp: 10 tablet, Rfl: 2    Current Allergies     Allergies as of 08/16/2023 - Reviewed 08/16/2023   Allergen Reaction Noted   • Penicillins Rash 01/28/2014            The following portions of the patient's history were reviewed and updated as appropriate: allergies, current medications, past family history, past medical history, past social history, past surgical history and problem list.     Past Medical History:   Diagnosis Date   • Multiple sclerosis ([address])    • Vaginal Pap smear 10/11/2017    WNL       Past Surgical History:   Procedure Laterality Date   • BLADDER SURGERY      bladder lift   • COLONOSCOPY  09/2004    normal   • HYSTERECTOMY     • HYSTERECTOMY W/ SALPINGO-OOPHERECTOMY  08/2012    laproscopic   • KNEE SURGERY Right     & Left knee    • OOPHORECTOMY Bilateral     age 61   • REPLACEMENT TOTAL KNEE Right        Family History   Problem Relation Age of Onset   • Osteoarthritis Mother    • Ulcers Father    • Lung cancer Father    • Hypertension Brother    • No Known Problems Sister    • No Known Problems Daughter    • No Known Problems Maternal Grandmother    • No Known Problems Maternal Grandfather    • No Known Problems Paternal Grandmother    • No Known Problems Paternal Grandfather    • No Known Problems Brother    • No Known Problems Maternal Aunt    • Breast cancer Maternal Aunt 50   • No Known Problems Maternal Aunt    • No Known Problems Maternal Aunt    • No Known Problems Paternal Aunt    • No Known Problems Paternal Aunt          Medications have been verified. Objective   /68 (BP Location: Left arm, Patient Position: Sitting)   Pulse 70   Temp (!) 97.1 °F (36.2 °C)   Resp 18   LMP  (LMP Unknown)   SpO2 97%        Physical Exam     Physical Exam  Vitals and nursing note reviewed. Constitutional:       Appearance: Normal appearance. HENT:      Head: Normocephalic and atraumatic. Nose: Nose normal.      Mouth/Throat:      Pharynx: Oropharynx is clear. Eyes:      Extraocular Movements: Extraocular movements intact. Pupils: Pupils are equal, round, and reactive to light. Cardiovascular:      Rate and Rhythm: Normal rate and regular rhythm. Pulses: Normal pulses. Pulmonary:      Effort: Pulmonary effort is normal. No respiratory distress. Breath sounds: Normal breath sounds. Abdominal:      Palpations: Abdomen is soft. Musculoskeletal:      Cervical back: Normal range of motion. Comments: Patient does ambulate with a slow, shuffling gait with a cane for assistance. Right upper extremity:  Skin without lesions, ecchymosis, erythema, warmth, swelling. Patient does admit to palpable tenderness along the anterior shoulder, rotator cuff insertion. Active shoulder forward flexion limited to 70 degrees, abduction 90 degrees, limited due to pain and weakness. Passively I am able to forward flex to 120 degrees, abduction to 110 degrees. Intact internal and external rotation similar to contralateral side. Full active range of motion of the elbow, wrist, digits without complaint. 5/5  strength.   5/5 strength resisted elbow flexion/extension. 5/5 strength resisted supination and pronation without pain. 4/5 strength resisted shoulder forward flexion and abduction. Sensation and motor intact along the radial, median, ulnar nerve distributions. Strong radial pulse. Brisk cap refill in all fingers. Skin:     General: Skin is warm and dry. Capillary Refill: Capillary refill takes less than 2 seconds. Neurological:      General: No focal deficit present. Mental Status: She is alert and oriented to person, place, and time.    Psychiatric:         Mood and Affect: Mood normal.         Behavior: Behavior normal.

## 2023-08-17 ENCOUNTER — OFFICE VISIT (OUTPATIENT)
Dept: OBGYN CLINIC | Facility: MEDICAL CENTER | Age: 68
End: 2023-08-17
Payer: MEDICARE

## 2023-08-17 ENCOUNTER — APPOINTMENT (OUTPATIENT)
Dept: RADIOLOGY | Facility: MEDICAL CENTER | Age: 68
End: 2023-08-17
Payer: MEDICARE

## 2023-08-17 VITALS
WEIGHT: 190 LBS | HEART RATE: 69 BPM | DIASTOLIC BLOOD PRESSURE: 80 MMHG | HEIGHT: 66 IN | SYSTOLIC BLOOD PRESSURE: 135 MMHG | BODY MASS INDEX: 30.53 KG/M2

## 2023-08-17 DIAGNOSIS — S46.091A OTHER INJURY OF MUSCLE(S) AND TENDON(S) OF THE ROTATOR CUFF OF RIGHT SHOULDER, INITIAL ENCOUNTER: ICD-10-CM

## 2023-08-17 DIAGNOSIS — M75.31 CALCIFIC TENDONITIS OF RIGHT SHOULDER: Primary | ICD-10-CM

## 2023-08-17 DIAGNOSIS — M19.011 PRIMARY OSTEOARTHRITIS OF RIGHT SHOULDER: ICD-10-CM

## 2023-08-17 PROCEDURE — 99203 OFFICE O/P NEW LOW 30 MIN: CPT | Performed by: STUDENT IN AN ORGANIZED HEALTH CARE EDUCATION/TRAINING PROGRAM

## 2023-08-17 PROCEDURE — 73030 X-RAY EXAM OF SHOULDER: CPT

## 2023-08-17 NOTE — PROGRESS NOTES
Assessment:   Diagnosis ICD-10-CM Associated Orders   1. Calcific tendonitis of right shoulder  M75.31 Ambulatory Referral to Orthopedic Surgery     XR shoulder 2+ vw right      2. Primary osteoarthritis of right shoulder  M19.011         Plan:  Reviewed today's physical exam findings and x-ray findings with patient at time of visit. X-Ray of right shoulder taken on 08/17/2023 were reviewed and showed mild degenerative changes present with calcifications consistent with calcific tendinitis. Risks and benefits of conservative and operative treatments were discussed in detail with the patient. It was explained to the patient that based upon the clinical and radiographic findings, at this point in time, this is not a surgical problem. Treatment for the above diagnoses and associated symptoms of this nature is generally conservative including a physician directed physical therapy program, improved fitness/weight loss, anti-inflammatories, and potentially various injections. Amb referral to formal outpatient physical therapy to improve ROM and strengthening of right shoulder. Complete prednisone taper as instructed. Weightbearing activities as tolerated. Patient expresses understanding and is in agreement with this treatment plan. The patient was given the opportunity to ask questions or present concerns. To do next visit:  Return if symptoms worsen or fail to improve. The above stated was discussed in layman's terms and the patient expressed understanding. All questions were answered to the patient's satisfaction. Scribe Attestation    I,:  Sandeep Hernandez am acting as a scribe while in the presence of the attending physician.:       I,:  Adela King DO personally performed the services described in this documentation    as scribed in my presence.:         Subjective:   Idania Butt is a 70 y.o. female who presents today for an Initial evaluation of her right shoulder due to acute pain.  The patient was last seen on 08/16/20[place] in [facility] [place] Aby Whitfield. CRISTOBAL Dangelo in which she received a referral to formal outpatient physical therapy, a prescription of prednisone 10 mg, and patient received a referral to orthopedic surgery for further evaluation of her right shoulder. On today's presentation, the patient states she was playing catch with her grand son at the pool about 10 days ago when she developed an onset of right shoulder pain. She also notes that she was doing water aerobics with a water weight in between her arm and reached behind her back feeling increased pain in her right shoulder. The patient has a history of a mass and notes that with any injury or illness her condition flares up. The patient does note that she has sustained a couple of falls onto her right shoulder but does not recall specific dates. Since her last visit at the [facility], she notes that her symptoms have significantly improved after taking the  prednisone. She does note difficulty with flexion exercises, overhead motions, lifting objects with weight, and horizontal abduction exercises and weakness. The patient notes that she is scheduled for formal outpatient physical therapy on 08/29/2023. She has been doing home exercise program and using ice/naproxen for symptomatic relief. Review of systems negative unless otherwise specified in HPI  Review of Systems   Constitutional: Negative for chills and fever. HENT: Negative for ear pain and sore throat. Eyes: Negative for pain and visual disturbance. Respiratory: Negative for cough and shortness of breath. Cardiovascular: Negative for chest pain and palpitations. Gastrointestinal: Negative for abdominal pain and vomiting. Genitourinary: Negative for dysuria and hematuria. Musculoskeletal: Negative for arthralgias and back pain. Skin: Negative for color change and rash. Neurological: Negative for seizures and syncope.    All other systems reviewed and are negative.       Past Medical History:   Diagnosis Date   • Multiple sclerosis ([address])    • Vaginal Pap smear 10/11/2017    WNL       Past Surgical History:   Procedure Laterality Date   • BLADDER SURGERY      bladder lift   • COLONOSCOPY  09/2004    normal   • HYSTERECTOMY     • HYSTERECTOMY W/ SALPINGO-OOPHERECTOMY  08/2012    laproscopic   • KNEE SURGERY Right     & Left knee    • OOPHORECTOMY Bilateral     age 61   • REPLACEMENT TOTAL KNEE Right        Family History   Problem Relation Age of Onset   • Osteoarthritis Mother    • Ulcers Father    • Lung cancer Father    • Hypertension Brother    • No Known Problems Sister    • No Known Problems Daughter    • No Known Problems Maternal Grandmother    • No Known Problems Maternal Grandfather    • No Known Problems Paternal Grandmother    • No Known Problems Paternal Grandfather    • No Known Problems Brother    • No Known Problems Maternal Aunt    • Breast cancer Maternal Aunt 50   • No Known Problems Maternal Aunt    • No Known Problems Maternal Aunt    • No Known Problems Paternal Aunt    • No Known Problems Paternal Aunt        Social History     Occupational History   • Not on file   Tobacco Use   • Smoking status: Never   • Smokeless tobacco: Never   Vaping Use   • Vaping Use: Not on file   Substance and Sexual Activity   • Alcohol use: Yes     Comment: occasionally   • Drug use: No   • Sexual activity: Not Currently       Current Outpatient Medications:   •  Ascorbic Acid (VITAMIN C, ACEROLA,) 500 MG tablet, Take 500 mg by mouth, Disp: , Rfl:   •  calcium carbonate 1250 MG capsule, Take 500 mg by mouth, Disp: , Rfl:   •  Cholecalciferol 5000 units capsule, Take 10,000 Units by mouth, Disp: , Rfl:   •  cyanocobalamin (VITAMIN B-12) 100 mcg tablet, 1 cap po daily, Disp: , Rfl:   •  ferrous sulfate 325 (65 Fe) mg tablet, daily, Disp: , Rfl:   •  Glucosamine-Chondroitin 750-600 MG TABS, Take by mouth, Disp: , Rfl:   •  Magnesium 100 MG CAPS, Take by mouth, Disp: , Rfl:   •  naproxen (NAPROSYN) 375 mg tablet, ONE PO TID PRN PAIN, Disp: , Rfl:   •  Omega-3 Fatty Acids (FISH OIL PO), Take by mouth, Disp: , Rfl:   •  predniSONE 10 mg tablet, 4 x 3 days, 3 x 1, 2 x 1, 1 x 1, Disp: 18 tablet, Rfl: 0  •  TURMERIC PO, Take by mouth, Disp: , Rfl:   •  Vitamin E Acetate LIQD, daily, Disp: , Rfl:   •  APPLE CIDER VINEGAR PO, Take by mouth (Patient not taking: Reported on 8/17/2022), Disp: , Rfl:   •  gabapentin (NEURONTIN) 300 mg capsule, Take 300 mg by mouth (Patient not taking: Reported on 8/16/2023), Disp: , Rfl:   •  ibuprofen (MOTRIN) 800 mg tablet, Take 800 mg by mouth (Patient not taking: Reported on 8/16/2023), Disp: , Rfl:   •  naratriptan (AMERGE) 2.5 MG tablet, Take 2.5 mg by mouth (Patient not taking: Reported on 8/16/2023), Disp: , Rfl:   •  NON FORMULARY, Take 1 Units by mouth daily (Patient not taking: Reported on 8/16/2023), Disp: , Rfl:   •  senna (SENOKOT) 8.6 MG tablet, Take by mouth (Patient not taking: Reported on 8/16/2023), Disp: , Rfl:   •  valACYclovir (VALTREX) 1,000 mg tablet, Take 1 tablet (1,000 mg total) by mouth 2 (two) times a day for 15 days Take for 5 days for cold sores, Disp: 10 tablet, Rfl: 2    Allergies   Allergen Reactions   • Penicillins Rash     Vitals:    08/17/23 1202   BP: 135/80   Pulse: 69     Objective:                    Ortho Exam  right Shoulder -   No anatomical deformity  Skin is warm and dry to touch with no signs of erythema, ecchymosis, or infection  No soft tissue swelling or effusion noted  Without palpable crepitus with passive motion  TTP over rotator cuff musculature  ROM FF 0-150, ABD 0-150, ER 0-45  MMT: 4/5 forward flexion and abduction  Demonstrates normal elbow, wrist, and finger motion  2+  distal radial pulse with brisk capillary refill to the fingers  Radial, median, ulnar and motor  and sensory distribution intact  Sensation to light touch intact distally    Diagnostics, reviewed and taken today if performed as documented: The attending physician has personally reviewed the pertinent films in PACS and interpretation is as follows:    X-Ray of right shoulder taken on 08/17/2023 were reviewed and showed mild degenerative changes present with calcifications consistent with calcific tendinitis. Procedures, if performed today:    None performed    Portions of the record may have been created with voice recognition software. Occasional wrong word or "sound a like" substitutions may have occurred due to the inherent limitations of voice recognition software. Read the chart carefully and recognize, using context, where substitutions have occurred.

## 2023-08-29 ENCOUNTER — EVALUATION (OUTPATIENT)
Dept: PHYSICAL THERAPY | Facility: MEDICAL CENTER | Age: 68
End: 2023-08-29
Payer: MEDICARE

## 2023-08-29 DIAGNOSIS — S46.091A OTHER INJURY OF MUSCLE(S) AND TENDON(S) OF THE ROTATOR CUFF OF RIGHT SHOULDER, INITIAL ENCOUNTER: Primary | ICD-10-CM

## 2023-08-29 PROCEDURE — 97161 PT EVAL LOW COMPLEX 20 MIN: CPT | Performed by: PHYSICAL THERAPIST

## 2023-08-30 NOTE — PROGRESS NOTES
PT Evaluation     Today's date: 2023  Patient name: Lennox Barnett  : 1955  MRN: [id number]  Referring provider: Peter Bone  Dx:   Encounter Diagnosis     ICD-10-CM    1. Other injury of muscle(s) and tendon(s) of the rotator cuff of right shoulder, initial encounter  .[id number] Ambulatory Referral to Physical Therapy                     Assessment  Assessment details: Lennox Barnett is a 70 y.o. female was evaluated on 2023  for Other injury of muscle(s) and tendon(s) of the rotator cuff of right shoulder, initial encounter  (primary encounter diagnosis). Lennox Barnett has the above listed impairments resulting in functional deficits and negative impact to quality of life. Patient is appropriate for skilled PT intervention to promote maximal return to function and patient specific goals. Patient agrees with outlined treatment plan and all questions were answered to their satisfaction. Impairments: abnormal muscle firing, abnormal muscle tone, abnormal or restricted ROM, impaired physical strength, lacks appropriate home exercise program and pain with function  Understanding of Dx/Px/POC: good   Prognosis: good    Goals  Patient will successfully transition to home exercise program.  Patient will be able to manage symptoms independently.     Balwinder Wallace will report no pain with reaching overhead   Balwinder Wallace will report no pain sleeping due to shoulder     Plan  Patient would benefit from: skilled PT  Referral necessary: No  Planned modality interventions: thermotherapy: hydrocollator packs  Planned therapy interventions: home exercise program, manual therapy, neuromuscular re-education, patient education, functional ROM exercises, strengthening, stretching, joint mobilization, graded activity, graded exercise, therapeutic exercise, body mechanics training, motor coordination training and activity modification  Frequency: 2x week  Duration in weeks: 12  Treatment plan discussed with: patient        Subjective Evaluation    History of Present Illness  Mechanism of injury: Brian Kwan is a 70 y.o. female presenting to therapy with right shoulder pain. She reports she was throwing a ball with her grandsons on vacation and felt fine that day but woke up next day with inability to raise arm. She notes that gradually the pain has reduced, she has been on steroids which has helped. She has regains most of her motion but still having some pain when reaching overhead. Radiographs indicated calcific tendonitis. Patient Goals  Patient goals for therapy: decreased pain, increased motion, return to sport/leisure activities, independence with ADLs/IADLs, increased strength and return to work    Pain  Current pain rating: 3  At best pain ratin  At worst pain ratin  Quality: dull ache, pressure and tight          Objective     Postural Observations  Seated posture: fair  Standing posture: fair        Tenderness     Right Shoulder  Tenderness in the supraspinatus tendon. Cervical/Thoracic Screen   Cervical range of motion within normal limits    Neurological Testing     Sensation     Shoulder   Left Shoulder   Intact: light touch    Right Shoulder   Intact: light touch    Active Range of Motion   Left Shoulder   Normal active range of motion    Right Shoulder   Normal active range of motion  Flexion: with pain  Abduction: with pain  External rotation BTH: with pain    Scapular Mobility     Right Shoulder   Scapular Dyskinesis: grade II    Scapular Mobility beyond 90° FF   Upward rotation: inadequate  Downward rotation: inadequate    Tests     Right Shoulder   Positive painful arc. Negative drop arm and internal rotation lag sign.               Precautions: None      Manuals             GHJ grade III Mobilizations  AF                                                   Neuro Re-Ed             Scap retraction             Low row              Wall slides Ther Ex                                                                                                                     Ther Activity                                       Gait Training                                       Modalities

## 2023-09-08 ENCOUNTER — OFFICE VISIT (OUTPATIENT)
Dept: PHYSICAL THERAPY | Facility: MEDICAL CENTER | Age: 68
End: 2023-09-08
Payer: MEDICARE

## 2023-09-08 DIAGNOSIS — S46.091A OTHER INJURY OF MUSCLE(S) AND TENDON(S) OF THE ROTATOR CUFF OF RIGHT SHOULDER, INITIAL ENCOUNTER: Primary | ICD-10-CM

## 2023-09-08 PROCEDURE — 97140 MANUAL THERAPY 1/> REGIONS: CPT | Performed by: PHYSICAL THERAPIST

## 2023-09-08 PROCEDURE — 97110 THERAPEUTIC EXERCISES: CPT | Performed by: PHYSICAL THERAPIST

## 2023-09-08 NOTE — PROGRESS NOTES
Daily Note     Today's date: 2023  Patient name: nAaly Hernandez  : 1955  MRN: [id number]  Referring provider: Robin Reardon  Dx:   Encounter Diagnosis     ICD-10-CM    1. Other injury of muscle(s) and tendon(s) of the rotator cuff of right shoulder, initial encounter  S46.354G                      Subjective: Bella Manjarrez reports that she is doing well, was feeling better for a period and then had some more soreness last few days       Objective: See treatment diary below      Assessment: Tolerated treatment well. Patient exhibited good technique with therapeutic exercises and would benefit from continued PT      Plan: Continue per plan of care.       Precautions: None      Manuals             GHJ grade III Mobilizations  AF                                                   Neuro Re-Ed             Scap retraction 30 yellow            Low row  30 yellow            Wall slides  30            SL ER 30             Yellow  30            Pulleys  5 min                          Ther Ex                                                                                                                     Ther Activity                                       Gait Training                                       Modalities

## 2023-09-13 ENCOUNTER — HOSPITAL ENCOUNTER (OUTPATIENT)
Dept: MAMMOGRAPHY | Facility: MEDICAL CENTER | Age: 68
Discharge: HOME | End: 2023-09-13
Payer: MEDICARE

## 2023-09-13 ENCOUNTER — OFFICE VISIT (OUTPATIENT)
Dept: FAMILY MEDICINE CLINIC | Facility: CLINIC | Age: 68
End: 2023-09-13
Payer: MEDICARE

## 2023-09-13 VITALS
WEIGHT: 191 LBS | RESPIRATION RATE: 16 BRPM | TEMPERATURE: 97.3 F | OXYGEN SATURATION: 98 % | HEIGHT: 66 IN | SYSTOLIC BLOOD PRESSURE: 114 MMHG | HEART RATE: 79 BPM | BODY MASS INDEX: 30.7 KG/M2 | DIASTOLIC BLOOD PRESSURE: 78 MMHG

## 2023-09-13 VITALS — WEIGHT: 191 LBS | HEIGHT: 66 IN | BODY MASS INDEX: 30.7 KG/M2

## 2023-09-13 DIAGNOSIS — G35 MULTIPLE SCLEROSIS (HCC): Primary | ICD-10-CM

## 2023-09-13 DIAGNOSIS — Z78.0 POST-MENOPAUSAL: ICD-10-CM

## 2023-09-13 DIAGNOSIS — R73.01 ELEVATED FASTING GLUCOSE: ICD-10-CM

## 2023-09-13 DIAGNOSIS — Z23 NEED FOR VACCINATION: ICD-10-CM

## 2023-09-13 DIAGNOSIS — Z12.31 ENCOUNTER FOR SCREENING MAMMOGRAM FOR MALIGNANT NEOPLASM OF BREAST: ICD-10-CM

## 2023-09-13 DIAGNOSIS — Z00.00 MEDICARE ANNUAL WELLNESS VISIT, SUBSEQUENT: ICD-10-CM

## 2023-09-13 DIAGNOSIS — M81.0 OSTEOPOROSIS WITHOUT CURRENT PATHOLOGICAL FRACTURE, UNSPECIFIED OSTEOPOROSIS TYPE: ICD-10-CM

## 2023-09-13 LAB — SL AMB POCT HEMOGLOBIN AIC: 5.5 (ref ?–6.5)

## 2023-09-13 PROCEDURE — 77063 BREAST TOMOSYNTHESIS BI: CPT

## 2023-09-13 PROCEDURE — 83036 HEMOGLOBIN GLYCOSYLATED A1C: CPT | Performed by: NURSE PRACTITIONER

## 2023-09-13 PROCEDURE — G0439 PPPS, SUBSEQ VISIT: HCPCS | Performed by: NURSE PRACTITIONER

## 2023-09-13 PROCEDURE — 77067 SCR MAMMO BI INCL CAD: CPT

## 2023-09-13 PROCEDURE — 99214 OFFICE O/P EST MOD 30 MIN: CPT | Performed by: NURSE PRACTITIONER

## 2023-09-13 PROCEDURE — G0008 ADMIN INFLUENZA VIRUS VAC: HCPCS | Performed by: NURSE PRACTITIONER

## 2023-09-13 PROCEDURE — 90662 IIV NO PRSV INCREASED AG IM: CPT | Performed by: NURSE PRACTITIONER

## 2023-09-13 NOTE — PATIENT INSTRUCTIONS
Medicare Preventive Visit Patient Instructions  Thank you for completing your Welcome to Medicare Visit or Medicare Annual Wellness Visit today. Your next wellness visit will be due in one year (9/13/2024). The screening/preventive services that you may require over the next 5-10 years are detailed below. Some tests may not apply to you based off risk factors and/or age. Screening tests ordered at today's visit but not completed yet may show as past due. Also, please note that scanned in results may not display below. Preventive Screenings:  Service Recommendations Previous Testing/Comments   Colorectal Cancer Screening  * Colonoscopy    * Fecal Occult Blood Test (FOBT)/Fecal Immunochemical Test (FIT)  * Fecal DNA/Cologuard Test  * Flexible Sigmoidoscopy Age: 43-73 years old   Colonoscopy: every 10 years (may be performed more frequently if at higher risk)  OR  FOBT/FIT: every 1 year  OR  Cologuard: every 3 years  OR  Sigmoidoscopy: every 5 years  Screening may be recommended earlier than age 39 if at higher risk for colorectal cancer. Also, an individualized decision between you and your healthcare provider will decide whether screening between the ages of 77-80 would be appropriate. Colonoscopy: 09/24/2021  FOBT/FIT: Not on file  Cologuard: Not on file  Sigmoidoscopy: Not on file    Screening Current     Breast Cancer Screening Age: 36 years old  Frequency: every 1-2 years  Not required if history of left and right mastectomy Mammogram: 05/25/2022    Screening Current   Cervical Cancer Screening Between the ages of 21-29, pap smear recommended once every 3 years. Between the ages of 32-69, can perform pap smear with HPV co-testing every 5 years.    Recommendations may differ for women with a history of total hysterectomy, cervical cancer, or abnormal pap smears in past. Pap Smear: 10/29/2021    Screening Not Indicated   Hepatitis C Screening Once for adults born between 1945 and 1965  More frequently in patients at high risk for Hepatitis C Hep C Antibody: 06/18/2021    Screening Current   Diabetes Screening 1-2 times per year if you're at risk for diabetes or have pre-diabetes Fasting glucose: 102 mg/dL (8/16/2023)  A1C: No results in last 5 years (No results in last 5 years)  Screening Current   Cholesterol Screening Once every 5 years if you don't have a lipid disorder. May order more often based on risk factors. Lipid panel: 08/16/2023    Screening Current     Other Preventive Screenings Covered by Medicare:  1. Abdominal Aortic Aneurysm (AAA) Screening: covered once if your at risk. You're considered to be at risk if you have a family history of AAA. 2. Lung Cancer Screening: covers low dose CT scan once per year if you meet all of the following conditions: (1) Age 48-67; (2) No signs or symptoms of lung cancer; (3) Current smoker or have quit smoking within the last 15 years; (4) You have a tobacco smoking history of at least 20 pack years (packs per day multiplied by number of years you smoked); (5) You get a written order from a healthcare provider. 3. Glaucoma Screening: covered annually if you're considered high risk: (1) You have diabetes OR (2) Family history of glaucoma OR (3)  aged 48 and older OR (3)  American aged 72 and older  3. Osteoporosis Screening: covered every 2 years if you meet one of the following conditions: (1) You're estrogen deficient and at risk for osteoporosis based off medical history and other findings; (2) Have a vertebral abnormality; (3) On glucocorticoid therapy for more than 3 months; (4) Have primary hyperparathyroidism; (5) On osteoporosis medications and need to assess response to drug therapy. · Last bone density test (DXA Scan): 05/31/2022.  5. HIV Screening: covered annually if you're between the age of 15-65. Also covered annually if you are younger than 13 and older than 72 with risk factors for HIV infection.  For pregnant patients, it is covered up to 3 times per pregnancy. Immunizations:  Immunization Recommendations   Influenza Vaccine Annual influenza vaccination during flu season is recommended for all persons aged >= 6 months who do not have contraindications   Pneumococcal Vaccine   * Pneumococcal conjugate vaccine = PCV13 (Prevnar 13), PCV15 (Vaxneuvance), PCV20 (Prevnar 20)  * Pneumococcal polysaccharide vaccine = PPSV23 (Pneumovax) Adults 20-63 years old: 1-3 doses may be recommended based on certain risk factors  Adults 72 years old: 1-2 doses may be recommended based off what pneumonia vaccine you previously received   Hepatitis B Vaccine 3 dose series if at intermediate or high risk (ex: diabetes, end stage renal disease, liver disease)   Tetanus (Td) Vaccine - COST NOT COVERED BY MEDICARE PART B Following completion of primary series, a booster dose should be given every 10 years to maintain immunity against tetanus. Td may also be given as tetanus wound prophylaxis. Tdap Vaccine - COST NOT COVERED BY MEDICARE PART B Recommended at least once for all adults. For pregnant patients, recommended with each pregnancy. Shingles Vaccine (Shingrix) - COST NOT COVERED BY MEDICARE PART B  2 shot series recommended in those aged 48 and above     Health Maintenance Due:      Topic Date Due   • Breast Cancer Screening: Mammogram  05/25/2023   • Colorectal Cancer Screening  09/24/2031   • Hepatitis C Screening  Completed     Immunizations Due:      Topic Date Due   • COVID-19 Vaccine (5 - Mixed Product series) 12/01/2021   • Influenza Vaccine (1) 09/01/2023     Advance Directives   What are advance directives? Advance directives are legal documents that state your wishes and plans for medical care. These plans are made ahead of time in case you lose your ability to make decisions for yourself. Advance directives can apply to any medical decision, such as the treatments you want, and if you want to donate organs.    What are the types of advance directives? There are many types of advance directives, and each state has rules about how to use them. You may choose a combination of any of the following:  · Living will: This is a written record of the treatment you want. You can also choose which treatments you do not want, which to limit, and which to stop at a certain time. This includes surgery, medicine, IV fluid, and tube feedings. · Durable power of  for [facility]): This is a written record that states who you want to make healthcare choices for you when you are unable to make them for yourself. This person, called a proxy, is usually a family member or a friend. You may choose more than 1 proxy. · Do not resuscitate (DNR) order:  A DNR order is used in case your heart stops beating or you stop breathing. It is a request not to have certain forms of treatment, such as CPR. A DNR order may be included in other types of advance directives. · Medical directive: This covers the care that you want if you are in a coma, near death, or unable to make decisions for yourself. You can list the treatments you want for each condition. Treatment may include pain medicine, surgery, blood transfusions, dialysis, IV or tube feedings, and a ventilator (breathing machine). · Values history: This document has questions about your views, beliefs, and how you feel and think about life. This information can help others choose the care that you would choose. Why are advance directives important? An advance directive helps you control your care. Although spoken wishes may be used, it is better to have your wishes written down. Spoken wishes can be misunderstood, or not followed. Treatments may be given even if you do not want them. An advance directive may make it easier for your family to make difficult choices about your care. Urinary Incontinence   Urinary incontinence (UI)  is when you lose control of your bladder.  UI develops because your bladder cannot store or empty urine properly. The 3 most common types of UI are stress incontinence, urge incontinence, or both. Medicines:   · May be given to help strengthen your bladder control. Report any side effects of medication to your healthcare provider. Do pelvic muscle exercises often:  Your pelvic muscles help you stop urinating. Squeeze these muscles tight for 5 seconds, then relax for 5 seconds. Gradually work up to squeezing for 10 seconds. Do 3 sets of 15 repetitions a day, or as directed. This will help strengthen your pelvic muscles and improve bladder control. Train your bladder:  Go to the bathroom at set times, such as every 2 hours, even if you do not feel the urge to go. You can also try to hold your urine when you feel the urge to go. For example, hold your urine for 5 minutes when you feel the urge to go. As that becomes easier, hold your urine for 10 minutes. Self-care:   · Keep a UI record. Write down how often you leak urine and how much you leak. Make a note of what you were doing when you leaked urine. · Drink liquids as directed. You may need to limit the amount of liquid you drink to help control your urine leakage. Do not drink any liquid right before you go to bed. Limit or do not have drinks that contain caffeine or alcohol. · Prevent constipation. Eat a variety of high-fiber foods. Good examples are high-fiber cereals, beans, vegetables, and whole-grain breads. Walking is the best way to trigger your intestines to have a bowel movement. · Exercise regularly and maintain a healthy weight. Weight loss and exercise will decrease pressure on your bladder and help you control your leakage. · Use a catheter as directed  to help empty your bladder. A catheter is a tiny, plastic tube that is put into your bladder to drain your urine. · Go to behavior therapy as directed. Behavior therapy may be used to help you learn to control your urge to urinate.     Weight Management   Why it is important to manage your weight:  Being overweight increases your risk of health conditions such as heart disease, high blood pressure, type 2 diabetes, and certain types of cancer. It can also increase your risk for osteoarthritis, sleep apnea, and other respiratory problems. Aim for a slow, steady weight loss. Even a small amount of weight loss can lower your risk of health problems. How to lose weight safely:  A safe and healthy way to lose weight is to eat fewer calories and get regular exercise. You can lose up about 1 pound a week by decreasing the number of calories you eat by 500 calories each day. Healthy meal plan for weight management:  A healthy meal plan includes a variety of foods, contains fewer calories, and helps you stay healthy. A healthy meal plan includes the following:  · Eat whole-grain foods more often. A healthy meal plan should contain fiber. Fiber is the part of grains, fruits, and vegetables that is not broken down by your body. Whole-grain foods are healthy and provide extra fiber in your diet. Some examples of whole-grain foods are whole-wheat breads and pastas, oatmeal, brown rice, and bulgur. · Eat a variety of vegetables every day. Include dark, leafy greens such as spinach, kale, alona greens, and mustard greens. Eat yellow and orange vegetables such as carrots, sweet potatoes, and winter squash. · Eat a variety of fruits every day. Choose fresh or canned fruit (canned in its own juice or light syrup) instead of juice. Fruit juice has very little or no fiber. · Eat low-fat dairy foods. Drink fat-free (skim) milk or 1% milk. Eat fat-free yogurt and low-fat cottage cheese. Try low-fat cheeses such as mozzarella and other reduced-fat cheeses. · Choose meat and other protein foods that are low in fat. Choose beans or other legumes such as split peas or lentils.  Choose fish, skinless poultry (chicken or turkey), or lean cuts of red meat (beef or pork). Before you cook meat or poultry, cut off any visible fat. · Use less fat and oil. Try baking foods instead of frying them. Add less fat, such as margarine, sour cream, regular salad dressing and mayonnaise to foods. Eat fewer high-fat foods. Some examples of high-fat foods include french fries, doughnuts, ice cream, and cakes. · Eat fewer sweets. Limit foods and drinks that are high in sugar. This includes candy, cookies, regular soda, and sweetened drinks. Exercise:  Exercise at least 30 minutes per day on most days of the week. Some examples of exercise include walking, biking, dancing, and swimming. You can also fit in more physical activity by taking the stairs instead of the elevator or parking farther away from stores. Ask your healthcare provider about the best exercise plan for you. © Copyright [employer] 2018 Information is for End User's use only and may not be sold, redistributed or otherwise used for commercial purposes.  All illustrations and images included in CareNotes® are the copyrighted property of [employer] Inc[employer] or [address] [place] [address]

## 2023-09-13 NOTE — PROGRESS NOTES
Assessment and Plan:     Problem List Items Addressed This Visit        Nervous and Auditory    Multiple sclerosis ([address])     Reports stable. Follows with Neurology ([facility]). Remains off medications at this time. Reports one recent flare with full recovery        Other Visit Diagnoses     Medicare annual wellness visit, subsequent    -  Primary    Wellness completed today    Need for vaccination        Relevant Orders    influenza vaccine, high-dose, PF 0.7 mL (FLUZONE HIGH-DOSE) (Completed)    Osteoporosis without current pathological fracture, unspecified osteoporosis type        Repeat Dexa today. Report in chart indicates Prolia use. Pt reports never being told she had osteoporosis nor did she take Prolia. Relevant Orders    DXA bone density spine hip and pelvis    Post-menopausal        Relevant Orders    DXA bone density spine hip and pelvis           Preventive health issues were discussed with patient, and age appropriate screening tests were ordered as noted in patient's After Visit Summary. Personalized health advice and appropriate referrals for health education or preventive services given if needed, as noted in patient's After Visit Summary.      History of Present Illness:     Patient presents for a Medicare Wellness Visit    HPI   Patient Care Team:  Jorge Ghosh DO as PCP - General     Review of Systems:     Review of Systems     Problem List:     Patient Active Problem List   Diagnosis   • Left foot drop   • Memory loss   • Migraine   • Multiple sclerosis ([address])   • Neurogenic bladder   • Postmenopausal atrophic vaginitis   • Reactive depression (situational)   • Vitamin D deficiency   • Welcome to Medicare preventive visit      Past Medical and Surgical History:     Past Medical History:   Diagnosis Date   • Multiple sclerosis ([address])    • Vaginal Pap smear 10/11/2017    WNL     Past Surgical History:   Procedure Laterality Date   • BLADDER SURGERY      bladder lift   • COLONOSCOPY  09/2004 normal   • HYSTERECTOMY     • HYSTERECTOMY W/ SALPINGO-OOPHERECTOMY  08/2012    laproscopic   • KNEE SURGERY Right     & Left knee    • OOPHORECTOMY Bilateral     age 61   • REPLACEMENT TOTAL KNEE Right       Family History:     Family History   Problem Relation Age of Onset   • Osteoarthritis Mother    • Ulcers Father    • Lung cancer Father    • Hypertension Brother    • No Known Problems Sister    • No Known Problems Daughter    • No Known Problems Maternal Grandmother    • No Known Problems Maternal Grandfather    • No Known Problems Paternal Grandmother    • No Known Problems Paternal Grandfather    • No Known Problems Brother    • No Known Problems Maternal Aunt    • Breast cancer Maternal Aunt 48   • No Known Problems Maternal Aunt    • No Known Problems Maternal Aunt    • No Known Problems Paternal Aunt    • No Known Problems Paternal Aunt       Social History:     Social History     Socioeconomic History   • Marital status: /Civil Union     Spouse name: None   • Number of children: None   • Years of education: None   • Highest education level: None   Occupational History   • None   Tobacco Use   • Smoking status: Never   • Smokeless tobacco: Never   Vaping Use   • Vaping Use: None   Substance and Sexual Activity   • Alcohol use: Yes     Comment: occasionally   • Drug use: No   • Sexual activity: Not Currently   Other Topics Concern   • None   Social History Narrative   • None     Social Determinants of Health     Financial Resource Strain: Low Risk  (9/8/2023)    Overall Financial Resource Strain (CARDIA)    • Difficulty of Paying Living Expenses: Not hard at all   Food Insecurity: Not on file   Transportation Needs: No Transportation Needs (9/8/2023)    PRAPARE - Transportation    • Lack of Transportation (Medical): No    • Lack of Transportation (Non-Medical):  No   Physical Activity: Not on file   Stress: Not on file   Social Connections: Not on file   Intimate Partner Violence: Not on file Housing Stability: Not on file      Medications and Allergies:     Current Outpatient Medications   Medication Sig Dispense Refill   • Ascorbic Acid (VITAMIN C, ACEROLA,) 500 MG tablet Take 500 mg by mouth     • calcium carbonate 1250 MG capsule Take 500 mg by mouth     • Cholecalciferol 5000 units capsule Take 10,000 Units by mouth     • cyanocobalamin (VITAMIN B-12) 100 mcg tablet 1 cap po daily     • ferrous sulfate 325 (65 Fe) mg tablet daily     • Glucosamine-Chondroitin 750-600 MG TABS Take by mouth     • Magnesium 100 MG CAPS Take by mouth     • naproxen (NAPROSYN) 375 mg tablet ONE PO TID PRN PAIN     • Omega-3 Fatty Acids (FISH OIL PO) Take by mouth     • TURMERIC PO Take by mouth     • Vitamin E Acetate LIQD daily     • APPLE CIDER VINEGAR PO Take by mouth (Patient not taking: Reported on 8/17/2022)     • gabapentin (NEURONTIN) 300 mg capsule Take 300 mg by mouth (Patient not taking: Reported on 8/16/2023)     • naratriptan (AMERGE) 2.5 MG tablet Take 2.5 mg by mouth (Patient not taking: Reported on 8/16/2023)     • NON FORMULARY Take 1 Units by mouth daily (Patient not taking: Reported on 8/16/2023)     • valACYclovir (VALTREX) 1,000 mg tablet Take 1 tablet (1,000 mg total) by mouth 2 (two) times a day for 15 days Take for 5 days for cold sores 10 tablet 2     No current facility-administered medications for this visit.      Allergies   Allergen Reactions   • Penicillins Rash      Immunizations:     Immunization History   Administered Date(s) Administered   • COVID-19 PFIZER VACCINE 0.3 ML IM 03/05/2021, 03/29/2021, 10/06/2021   • COVID-19, unspecified 03/05/2021   • Hep B, adult 04/02/2018   • INFLUENZA 12/12/2014, 11/01/2018   • Influenza, high dose seasonal 0.7 mL 02/17/2020, 09/13/2023   • Influenza, seasonal, injectable 12/03/2014   • Pneumococcal Conjugate 13-Valent 02/19/2020   • Pneumococcal Polysaccharide PPV23 06/30/2021   • Tdap 06/19/2006, 10/11/2013      Health Maintenance:         Topic Date Due   • Breast Cancer Screening: Mammogram  05/25/2023   • Colorectal Cancer Screening  09/24/2031   • Hepatitis C Screening  Completed         Topic Date Due   • COVID-19 Vaccine (5 - Mixed Product series) 12/01/2021      Medicare Screening Tests and Risk Assessments:     Marine Jacob is here for her Subsequent Wellness visit. Last Medicare Wellness visit information reviewed, patient interviewed and updates made to the record today. Health Risk Assessment:   Patient rates overall health as very good. Patient feels that their physical health rating is same. Patient is very satisfied with their life. Eyesight was rated as slightly worse. Hearing was rated as same. Patient feels that their emotional and mental health rating is slightly better. Patients states they are never, rarely angry. Patient states they are sometimes unusually tired/fatigued. Pain experienced in the last 7 days has been some. Patient's pain rating has been 5/10. Patient states that she has experienced no weight loss or gain in last 6 months. Depression Screening:   PHQ-9 Score: 1      Fall Risk Screening: In the past year, patient has experienced: no history of falling in past year      Urinary Incontinence Screening:   Patient has leaked urine accidently in the last six months. Home Safety:  Patient has trouble with stairs inside or outside of their home. Patient has working smoke alarms and has working carbon monoxide detector. Home safety hazards include: none. Nutrition:   Current diet is Limited junk food. Medications:   Patient is currently taking over-the-counter supplements. OTC medications include: all vitamins are listed in my chart. Patient is able to manage medications. Activities of Daily Living (ADLs)/Instrumental Activities of Daily Living (IADLs):   Walk and transfer into and out of bed and chair?: Yes  Dress and groom yourself?: Yes    Bathe or shower yourself?: Yes    Feed yourself?  Yes  Do your laundry/housekeeping?: Yes  Manage your money, pay your bills and track your expenses?: Yes  Make your own meals?: Yes    Do your own shopping?: Yes    Previous Hospitalizations:   Any hospitalizations or ED visits within the last 12 months?: No      Advance Care Planning:   Living will: No    Durable POA for healthcare: No    Advanced directive: No    Advanced directive counseling given: No    Five wishes given: Yes    Patient declined ACP directive: No      Comments: Pt has 5 wishes at home but still has to complete it, I encouraged her to do so    Cognitive Screening:   Provider or family/friend/caregiver concerned regarding cognition?: No    PREVENTIVE SCREENINGS      Cardiovascular Screening:    General: Screening Current      Diabetes Screening:     General: Screening Current      Colorectal Cancer Screening:     General: Screening Current      Breast Cancer Screening:     General: Screening Current    Due for: Mammogram        Cervical Cancer Screening:    General: Screening Not Indicated      Osteoporosis Screening:    General: History Osteoporosis    Due for: Bone Density Ultrasound      Abdominal Aortic Aneurysm (AAA) Screening:        General: Screening Not Indicated      Lung Cancer Screening:     General: Screening Not Indicated      Hepatitis C Screening:    General: Screening Current    Screening, Brief Intervention, and Referral to Treatment (SBIRT)    Screening  Typical number of drinks in a day: 0  Typical number of drinks in a week: 0  Interpretation: Low risk drinking behavior. AUDIT-C Screenin) How often did you have a drink containing alcohol in the past year? monthly or less  2) How many drinks did you have on a typical day when you were drinking in the past year?  1 to 2  3) How often did you have 6 or more drinks on one occasion in the past year? never    AUDIT-C Score: 1  Interpretation: Score 0-2 (female): Negative screen for alcohol misuse    Single Item Drug Screening:  How often have you used an illegal drug (including marijuana) or a prescription medication for non-medical reasons in the past year? never    Single Item Drug Screen Score: 0  Interpretation: Negative screen for possible drug use disorder    Other Counseling Topics:   Car/seat belt/driving safety, skin self-exam, sunscreen and regular weightbearing exercise and calcium and vitamin D intake. No results found.      Physical Exam:     /78 (BP Location: Left arm, Patient Position: Sitting, Cuff Size: Large)   Pulse 79   Temp (!) 97.3 °F (36.3 °C) (Temporal)   Resp 16   Ht 5' 6" (1.676 m)   Wt 86.6 kg (191 lb)   LMP  (LMP Unknown)   SpO2 98%   BMI 30.83 kg/m²     Physical Exam     JANAY Donohue

## 2023-09-15 ENCOUNTER — OFFICE VISIT (OUTPATIENT)
Dept: PHYSICAL THERAPY | Facility: MEDICAL CENTER | Age: 68
End: 2023-09-15
Payer: MEDICARE

## 2023-09-15 DIAGNOSIS — S46.091A OTHER INJURY OF MUSCLE(S) AND TENDON(S) OF THE ROTATOR CUFF OF RIGHT SHOULDER, INITIAL ENCOUNTER: Primary | ICD-10-CM

## 2023-09-15 PROCEDURE — 97140 MANUAL THERAPY 1/> REGIONS: CPT | Performed by: PHYSICAL THERAPIST

## 2023-09-15 PROCEDURE — 97110 THERAPEUTIC EXERCISES: CPT | Performed by: PHYSICAL THERAPIST

## 2023-09-15 NOTE — PROGRESS NOTES
Daily Note     Today's date: 9/15/2023  Patient name: Cipriano Vazquez  : 1955  MRN: [id number]  Referring provider: Saeed Bullard  Dx:   Encounter Diagnosis     ICD-10-CM    1. Other injury of muscle(s) and tendon(s) of the rotator cuff of right shoulder, initial encounter  S46.091A                      Subjective: Starr Prado reports that she is doing well, was feeling better for a period and then had some more soreness last few days       Objective: See treatment diary below      Assessment: Tolerated treatment well. Patient exhibited good technique with therapeutic exercises and would benefit from continued PT      Plan: Continue per plan of care.       Precautions: None      Manuals 9/15            GHJ grade III Mobilizations  AF                                                   Neuro Re-Ed             Scap retraction 30 yellow            Low row  30 yellow            Wall slides  30            SL ER 30             Yellow  30            Pulleys  5 min                          Ther Ex                                                                                                                     Ther Activity                                       Gait Training                                       Modalities

## 2023-09-22 ENCOUNTER — APPOINTMENT (OUTPATIENT)
Dept: PHYSICAL THERAPY | Facility: MEDICAL CENTER | Age: 68
End: 2023-09-22
Payer: MEDICARE

## 2023-09-29 ENCOUNTER — OFFICE VISIT (OUTPATIENT)
Dept: PHYSICAL THERAPY | Facility: MEDICAL CENTER | Age: 68
End: 2023-09-29
Payer: MEDICARE

## 2023-09-29 DIAGNOSIS — S46.091A OTHER INJURY OF MUSCLE(S) AND TENDON(S) OF THE ROTATOR CUFF OF RIGHT SHOULDER, INITIAL ENCOUNTER: Primary | ICD-10-CM

## 2023-09-29 PROCEDURE — 97140 MANUAL THERAPY 1/> REGIONS: CPT | Performed by: PHYSICAL THERAPIST

## 2023-09-29 PROCEDURE — 97110 THERAPEUTIC EXERCISES: CPT | Performed by: PHYSICAL THERAPIST

## 2023-09-29 NOTE — PROGRESS NOTES
Daily Note     Today's date: 2023  Patient name: Last Blankenship  : 1955  MRN: [id number]  Referring provider: Ирина Laughlin  Dx:   Encounter Diagnosis     ICD-10-CM    1. Other injury of muscle(s) and tendon(s) of the rotator cuff of right shoulder, initial encounter  S46.091A                      Subjective: Shaheed Holbrook reports that she is doing well, was feeling better but still gets increased symptoms with repetitive overhead reaching       Objective: See treatment diary below      Assessment: Tolerated treatment well. Patient exhibited good technique with therapeutic exercises and would benefit from continued PT      Plan: Continue per plan of care.       Precautions: None      Manuals             GHJ grade III Mobilizations  AF                                                   Neuro Re-Ed             Scap retraction 30 yellow            Low row  30 yellow            Wall slides  30            SL ER 30             Yellow  30            Pulleys  5 min             Robbery 30 yellow              Ther Ex                                                                                                                     Ther Activity                                       Gait Training                                       Modalities

## 2023-10-13 ENCOUNTER — OFFICE VISIT (OUTPATIENT)
Dept: PHYSICAL THERAPY | Facility: MEDICAL CENTER | Age: 68
End: 2023-10-13
Payer: MEDICARE

## 2023-10-13 DIAGNOSIS — S46.091A OTHER INJURY OF MUSCLE(S) AND TENDON(S) OF THE ROTATOR CUFF OF RIGHT SHOULDER, INITIAL ENCOUNTER: Primary | ICD-10-CM

## 2023-10-13 PROCEDURE — 97110 THERAPEUTIC EXERCISES: CPT | Performed by: PHYSICAL THERAPIST

## 2023-10-13 PROCEDURE — 97140 MANUAL THERAPY 1/> REGIONS: CPT | Performed by: PHYSICAL THERAPIST

## 2023-10-16 NOTE — PROGRESS NOTES
Daily Note     Today's date: 10/16/2023  Patient name: Ran Hernandez  : 1955  MRN: [id number]  Referring provider: Nahun Flores  Dx:   Encounter Diagnosis     ICD-10-CM    1. Other injury of muscle(s) and tendon(s) of the rotator cuff of right shoulder, initial encounter  S46.091A                      Subjective: Jennifer Paul reports that she is doing well, was feeling better but still gets increased symptoms with repetitive overhead reaching       Objective: See treatment diary below      Assessment: Tolerated treatment well. Patient exhibited good technique with therapeutic exercises and would benefit from continued PT      Plan: Continue per plan of care.       Precautions: None      Manuals 10/13            GHJ grade III Mobilizations  AF                                                   Neuro Re-Ed             Scap retraction 30 yellow            Low row  30 yellow            Wall slides  30            SL ER 30             Yellow  30            Pulleys  5 min             Robbery 30 yellow              Ther Ex                                                                                                                     Ther Activity                                       Gait Training                                       Modalities

## 2023-11-01 ENCOUNTER — OFFICE VISIT (OUTPATIENT)
Dept: PHYSICAL THERAPY | Facility: MEDICAL CENTER | Age: 68
End: 2023-11-01
Payer: MEDICARE

## 2023-11-01 DIAGNOSIS — S46.091A OTHER INJURY OF MUSCLE(S) AND TENDON(S) OF THE ROTATOR CUFF OF RIGHT SHOULDER, INITIAL ENCOUNTER: Primary | ICD-10-CM

## 2023-11-01 PROCEDURE — 97110 THERAPEUTIC EXERCISES: CPT | Performed by: PHYSICAL THERAPIST

## 2023-11-15 ENCOUNTER — OFFICE VISIT (OUTPATIENT)
Dept: PHYSICAL THERAPY | Facility: MEDICAL CENTER | Age: 68
End: 2023-11-15
Payer: MEDICARE

## 2023-11-15 DIAGNOSIS — S46.091A OTHER INJURY OF MUSCLE(S) AND TENDON(S) OF THE ROTATOR CUFF OF RIGHT SHOULDER, INITIAL ENCOUNTER: Primary | ICD-10-CM

## 2023-11-15 PROCEDURE — 97140 MANUAL THERAPY 1/> REGIONS: CPT | Performed by: PHYSICAL THERAPIST

## 2023-11-15 PROCEDURE — 97110 THERAPEUTIC EXERCISES: CPT | Performed by: PHYSICAL THERAPIST

## 2023-11-17 NOTE — PROGRESS NOTES
Daily Note     Today's date: 2023  Patient name: Juli Case  : 1955  MRN: [id number]  Referring provider: Rick Camp  Dx:   Encounter Diagnosis     ICD-10-CM    1. Other injury of muscle(s) and tendon(s) of the rotator cuff of right shoulder, initial encounter  S46.091A                      Subjective: Rolanda Yanes reports that she is doing well, feeling better overall with only seldom pain       Objective: See treatment diary below      Assessment: Tolerated treatment well. Patient exhibited good technique with therapeutic exercises and would benefit from continued PT      Plan: Continue per plan of care.       Precautions: None      Manuals             GHJ grade III Mobilizations  AF                                                   Neuro Re-Ed             Scap retraction 30 red            Low row  30 red            Wall slides  30            SL ER 30             red  30            Pulleys  5 min             Robbery 30 red              Ther Ex                                                                                                                     Ther Activity                                       Gait Training                                       Modalities

## 2023-11-29 NOTE — PROGRESS NOTES
[facility]    ASSESSMENT AND PLAN     1. Multiple sclerosis ([address])  Assessment & Plan:  Reports stable. Follows with Neurology ([facility]). Remains off medications at this time. Reports one recent flare with full recovery      2. Need for vaccination  -     influenza vaccine, high-dose, PF 0.7 mL (FLUZONE HIGH-DOSE)    3. Osteoporosis without current pathological fracture, unspecified osteoporosis type  Comments:  Repeat Dexa today. Report in chart indicates Prolia use. Pt reports never being told she had osteoporosis nor did she take Prolia. Orders:  -     DXA bone density spine hip and pelvis; Future; Expected date: 09/13/2023    4. Post-menopausal  -     DXA bone density spine hip and pelvis; Future; Expected date: 09/13/2023    5. Elevated fasting glucose  Comments:  A1c 5.5  Orders:  -     POCT hemoglobin A1c    6. Medicare annual wellness visit, subsequent  Comments:  Wellness completed today     Lab work reviewed today. BMI Counseling: Body mass index is 30.83 kg/m². The BMI is above normal. Nutrition recommendations include moderation in carbohydrate intake and reducing intake of saturated and trans fat. Exercise recommendations include exercising 3-5 times per week. Rationale for BMI follow-up plan is due to patient being overweight or obese. Healthy lifestyle counseling    Depression Screening and Follow-up Plan: Denies depression today    Falls Plan of Care: Medications that increase falls were reviewed. Assessed feet and footwear. Ambulates with a cane- MS  Denies any recent falls        SUBJECTIVE       Patient ID: Cipriano Vazquez is a 70 y.o. female.     Chief Complaint   Patient presents with   • Medicare Wellness Visit       HISTORY OF PRESENT ILLNESS    Medicare wellness today  Recent lab work completed-review  No new concerns        The following portions of the patient's history were reviewed and updated as appropriate: allergies, current medications, past family history, past Sheath was inserted in the right femoral artery. Ultrasound guidance was used. Exchanged for micropuncture in RFA over Amplatz wire medical history, past social history, past surgical history, and problem list.    REVIEW OF SYSTEMS  Review of Systems   Constitutional: Negative. Negative for activity change, appetite change and fatigue. HENT: Negative. Respiratory: Negative. Cardiovascular: Negative. Gastrointestinal: Negative. Genitourinary: Negative. Neurological: Negative. Psychiatric/Behavioral: Negative.         OBJECTIVE      VITAL SIGNS  /78 (BP Location: Left arm, Patient Position: Sitting, Cuff Size: Large)   Pulse 79   Temp (!) 97.3 °F (36.3 °C) (Temporal)   Resp 16   Ht 5' 6" (1.676 m)   Wt 86.6 kg (191 lb)   LMP  (LMP Unknown)   SpO2 98%   BMI 30.83 kg/m²     CURRENT MEDICATIONS    Current Outpatient Medications:   •  Ascorbic Acid (VITAMIN C, ACEROLA,) 500 MG tablet, Take 500 mg by mouth, Disp: , Rfl:   •  calcium carbonate 1250 MG capsule, Take 500 mg by mouth, Disp: , Rfl:   •  Cholecalciferol 5000 units capsule, Take 10,000 Units by mouth, Disp: , Rfl:   •  cyanocobalamin (VITAMIN B-12) 100 mcg tablet, 1 cap po daily, Disp: , Rfl:   •  ferrous sulfate 325 (65 Fe) mg tablet, daily, Disp: , Rfl:   •  Glucosamine-Chondroitin 750-600 MG TABS, Take by mouth, Disp: , Rfl:   •  Magnesium 100 MG CAPS, Take by mouth, Disp: , Rfl:   •  naproxen (NAPROSYN) 375 mg tablet, ONE PO TID PRN PAIN, Disp: , Rfl:   •  Omega-3 Fatty Acids (FISH OIL PO), Take by mouth, Disp: , Rfl:   •  TURMERIC PO, Take by mouth, Disp: , Rfl:   •  Vitamin E Acetate LIQD, daily, Disp: , Rfl:   •  APPLE CIDER VINEGAR PO, Take by mouth (Patient not taking: Reported on 8/17/2022), Disp: , Rfl:   •  gabapentin (NEURONTIN) 300 mg capsule, Take 300 mg by mouth (Patient not taking: Reported on 8/16/2023), Disp: , Rfl:   •  naratriptan (AMERGE) 2.5 MG tablet, Take 2.5 mg by mouth (Patient not taking: Reported on 8/16/2023), Disp: , Rfl:   •  NON FORMULARY, Take 1 Units by mouth daily (Patient not taking: Reported on 8/16/2023), Disp: , Rfl: •  valACYclovir (VALTREX) 1,000 mg tablet, Take 1 tablet (1,000 mg total) by mouth 2 (two) times a day for 15 days Take for 5 days for cold sores, Disp: 10 tablet, Rfl: 2      PHYSICAL EXAMINATION   Physical Exam  Vitals and nursing note reviewed. Constitutional:       General: She is not in acute distress. Appearance: Normal appearance. She is well-developed and well-groomed. She is not ill-appearing. HENT:      Head: Normocephalic. Right Ear: Tympanic membrane and ear canal normal. Decreased hearing noted. Left Ear: Tympanic membrane and ear canal normal. Decreased hearing noted. Ears:      Comments: Uses bilateral hearing aids     Nose: Nose normal.   Neck:      Thyroid: No thyromegaly. Vascular: No carotid bruit. Cardiovascular:      Rate and Rhythm: Normal rate and regular rhythm. Comments: Mild-nonpitting lower extremity edema  Discussed compression hose and elevation  Pulmonary:      Effort: Pulmonary effort is normal. No respiratory distress. Breath sounds: Normal breath sounds and air entry. Lymphadenopathy:      Cervical: No cervical adenopathy. Skin:     General: Skin is warm and dry. Neurological:      General: No focal deficit present. Mental Status: She is alert and oriented to person, place, and time. Comments: Ambulates with a cane   Psychiatric:         Attention and Perception: Attention normal.         Mood and Affect: Mood normal.         Behavior: Behavior normal.         Thought Content:  Thought content normal.         Judgment: Judgment normal.

## 2023-12-04 ENCOUNTER — OFFICE VISIT (OUTPATIENT)
Dept: PHYSICAL THERAPY | Facility: MEDICAL CENTER | Age: 68
End: 2023-12-04
Payer: MEDICARE

## 2023-12-04 DIAGNOSIS — S46.091A OTHER INJURY OF MUSCLE(S) AND TENDON(S) OF THE ROTATOR CUFF OF RIGHT SHOULDER, INITIAL ENCOUNTER: Primary | ICD-10-CM

## 2023-12-04 PROCEDURE — 97140 MANUAL THERAPY 1/> REGIONS: CPT | Performed by: PHYSICAL THERAPIST

## 2023-12-04 PROCEDURE — 97110 THERAPEUTIC EXERCISES: CPT | Performed by: PHYSICAL THERAPIST

## 2023-12-04 NOTE — PROGRESS NOTES
Daily Note     Today's date: 2023  Patient name: Judie Mart  : 1955  MRN: [id number]  Referring provider: Philip   Dx:   Encounter Diagnosis     ICD-10-CM    1. Other injury of muscle(s) and tendon(s) of the rotator cuff of right shoulder, initial encounter  S46.091A                      Subjective: Marine Jacob reports that she is doing well,       Objective: See treatment diary below      Assessment: Tolerated treatment well. Patient to continue with HEP, only to return if needed as her shoulder is doing very well at this point         Plan: Continue per plan of care.       Precautions: None      Manuals             GHJ grade III Mobilizations  AF                                                   Neuro Re-Ed             Scap retraction 30 red            Low row  30 red            Wall slides  30            SL ER 30             red  30            Pulleys  5 min             Robbery 30 red              Ther Ex                                                                                                                     Ther Activity                                       Gait Training                                       Modalities

## 2024-02-21 PROBLEM — Z00.00 WELCOME TO MEDICARE PREVENTIVE VISIT: Status: RESOLVED | Noted: 2020-02-17 | Resolved: 2024-02-21

## 2024-03-13 ENCOUNTER — OFFICE VISIT (OUTPATIENT)
Dept: FAMILY MEDICINE CLINIC | Facility: CLINIC | Age: 69
End: 2024-03-13
Payer: MEDICARE

## 2024-03-13 VITALS
SYSTOLIC BLOOD PRESSURE: 158 MMHG | BODY MASS INDEX: 30.73 KG/M2 | WEIGHT: 190.4 LBS | HEART RATE: 68 BPM | DIASTOLIC BLOOD PRESSURE: 90 MMHG | OXYGEN SATURATION: 94 % | TEMPERATURE: 98 F

## 2024-03-13 DIAGNOSIS — R10.84 GENERALIZED ABDOMINAL PAIN: Primary | ICD-10-CM

## 2024-03-13 DIAGNOSIS — G35 MULTIPLE SCLEROSIS (HCC): ICD-10-CM

## 2024-03-13 PROCEDURE — G2211 COMPLEX E/M VISIT ADD ON: HCPCS | Performed by: FAMILY MEDICINE

## 2024-03-13 PROCEDURE — 99213 OFFICE O/P EST LOW 20 MIN: CPT | Performed by: FAMILY MEDICINE

## 2024-03-13 NOTE — PROGRESS NOTES
Name: Tanner Akbar      : 1955      MRN: [id number]  Encounter Provider: Xu Huang DO  Encounter Date: 3/13/2024   Encounter department: [facility]    Assessment & Plan   Unremarkable physical exam today.  History nonsuggestive of specific etiology.  Check ultrasound abdomen, lab work including lipase and celiac disease panel.  Some constipation on history which may be contributory.  Over-the-counter remedies recommended including MiraLAX.      1. Generalized abdominal pain  -     US abdomen complete; Future; Expected date: 2024  -     Comprehensive metabolic panel; Future; Expected date: 2024  -     CBC and differential; Future; Expected date: 2024  -     Lipase; Future; Expected date: 2024  -     Celiac Disease Antibody Profile; Future; Expected date: 2024    2. Multiple sclerosis (HCC)         Subjective      Patient was seen for complaint of abdominal discomfort which has been present approximately 3 weeks.  No change in bowel habits.  No nausea vomiting.  Patient describes symptoms as a gnawing discomfort which does not change with food.      Review of Systems   Constitutional: Negative.    Respiratory: Negative.     Cardiovascular: Negative.    Gastrointestinal:  Positive for abdominal pain.   Genitourinary: Negative.    Musculoskeletal: Negative.    Psychiatric/Behavioral: Negative.         Current Outpatient Medications on File Prior to Visit   Medication Sig   • Ascorbic Acid (VITAMIN C, ACEROLA,) 500 MG tablet Take 500 mg by mouth   • calcium carbonate 1250 MG capsule Take 500 mg by mouth   • Cholecalciferol 5000 units capsule Take 10,000 Units by mouth   • cyanocobalamin (VITAMIN B-12) 100 mcg tablet 1 cap po daily   • ferrous sulfate 325 (65 Fe) mg tablet daily   • Glucosamine-Chondroitin 750-600 MG TABS Take by mouth   • Magnesium 100 MG CAPS Take by mouth   • naproxen (NAPROSYN) 375 mg tablet ONE PO TID PRN PAIN   •  naratriptan (AMERGE) 2.5 MG tablet Take 2.5 mg by mouth   • NON FORMULARY Take 1 Units by mouth daily   • Omega-3 Fatty Acids (FISH OIL PO) Take by mouth   • TURMERIC PO Take by mouth   • Vitamin E Acetate LIQD daily   • APPLE CIDER VINEGAR PO Take by mouth (Patient not taking: Reported on 8/17/2022)   • gabapentin (NEURONTIN) 300 mg capsule Take 300 mg by mouth (Patient not taking: Reported on 8/16/2023)   • valACYclovir (VALTREX) 1,000 mg tablet Take 1 tablet (1,000 mg total) by mouth 2 (two) times a day for 15 days Take for 5 days for cold sores   • [DISCONTINUED] acyclovir (ZOVIRAX) 5 % ointment Apply topically every 3 (three) hours       Objective     /90 (BP Location: Left arm, Patient Position: Sitting, Cuff Size: Large)   Pulse 68   Temp 98 °F (36.7 °C)   Wt 86.4 kg (190 lb 6.4 oz)   LMP  (LMP Unknown)   SpO2 94%   BMI 30.73 kg/m²     Physical Exam  Vitals and nursing note reviewed.   Constitutional:       General: She is not in acute distress.     Appearance: Normal appearance. She is well-developed. She is not diaphoretic.   HENT:      Head: Normocephalic and atraumatic.   Eyes:      General:         Right eye: No discharge.      Conjunctiva/sclera: Conjunctivae normal.      Pupils: Pupils are equal, round, and reactive to light.   Neck:      Thyroid: No thyromegaly.   Cardiovascular:      Rate and Rhythm: Normal rate and regular rhythm.   Pulmonary:      Effort: Pulmonary effort is normal. No respiratory distress.      Breath sounds: Normal breath sounds.   Abdominal:      General: Abdomen is flat. Bowel sounds are normal.      Palpations: Abdomen is soft.      Tenderness: There is no abdominal tenderness.   Musculoskeletal:      Cervical back: Normal range of motion.   Lymphadenopathy:      Cervical: No cervical adenopathy.   Skin:     General: Skin is warm and dry.   Neurological:      Mental Status: She is alert and oriented to person, place, and time.   Psychiatric:         Mood and  Affect: Mood normal.         Behavior: Behavior normal.         Thought Content: Thought content normal.         Judgment: Judgment normal.       Xu Huang, DO

## 2024-04-17 ENCOUNTER — HOSPITAL ENCOUNTER (OUTPATIENT)
Dept: ULTRASOUND IMAGING | Facility: MEDICAL CENTER | Age: 69
Discharge: HOME | End: 2024-04-17

## 2024-04-17 DIAGNOSIS — R10.84 GENERALIZED ABDOMINAL PAIN: ICD-10-CM

## 2024-05-07 ENCOUNTER — HOSPITAL ENCOUNTER (OUTPATIENT)
Dept: ULTRASOUND IMAGING | Facility: MEDICAL CENTER | Age: 69
Discharge: HOME | End: 2024-05-07
Payer: MEDICARE

## 2024-05-07 ENCOUNTER — APPOINTMENT (OUTPATIENT)
Dept: LAB | Facility: MEDICAL CENTER | Age: 69
End: 2024-05-07
Payer: MEDICARE

## 2024-05-07 DIAGNOSIS — R10.84 GENERALIZED ABDOMINAL PAIN: ICD-10-CM

## 2024-05-07 LAB
ALBUMIN SERPL BCP-MCNC: 4.2 G/DL (ref 3.5–5)
ALP SERPL-CCNC: 56 U/L (ref 34–104)
ALT SERPL W P-5'-P-CCNC: 19 U/L (ref 7–52)
ANION GAP SERPL CALCULATED.3IONS-SCNC: 8 MMOL/L (ref 4–13)
AST SERPL W P-5'-P-CCNC: 18 U/L (ref 13–39)
BASOPHILS # BLD AUTO: 0.02 THOUSANDS/ÂΜL (ref 0–0.1)
BASOPHILS NFR BLD AUTO: 0 % (ref 0–1)
BILIRUB SERPL-MCNC: 0.51 MG/DL (ref 0.2–1)
BUN SERPL-MCNC: 15 MG/DL (ref 5–25)
CALCIUM SERPL-MCNC: 9.3 MG/DL (ref 8.4–10.2)
CHLORIDE SERPL-SCNC: 104 MMOL/L (ref 96–108)
CO2 SERPL-SCNC: 31 MMOL/L (ref 21–32)
CREAT SERPL-MCNC: 0.63 MG/DL (ref 0.6–1.3)
EOSINOPHIL # BLD AUTO: 0.14 THOUSAND/ÂΜL (ref 0–0.61)
EOSINOPHIL NFR BLD AUTO: 3 % (ref 0–6)
ERYTHROCYTE [DISTWIDTH] IN BLOOD BY AUTOMATED COUNT: 12.6 % (ref 11.6–15.1)
GFR SERPL CREATININE-BSD FRML MDRD: 91 ML/MIN/1.73SQ M
GLUCOSE P FAST SERPL-MCNC: 83 MG/DL (ref 65–99)
HCT VFR BLD AUTO: 43.6 % (ref 34.8–46.1)
HGB BLD-MCNC: 14.7 G/DL (ref 11.5–15.4)
IMM GRANULOCYTES # BLD AUTO: 0 THOUSAND/UL (ref 0–0.2)
IMM GRANULOCYTES NFR BLD AUTO: 0 % (ref 0–2)
LIPASE SERPL-CCNC: 33 U/L (ref 11–82)
LYMPHOCYTES # BLD AUTO: 1.37 THOUSANDS/ÂΜL (ref 0.6–4.47)
LYMPHOCYTES NFR BLD AUTO: 29 % (ref 14–44)
MCH RBC QN AUTO: 31.3 PG (ref 26.8–34.3)
MCHC RBC AUTO-ENTMCNC: 33.7 G/DL (ref 31.4–37.4)
MCV RBC AUTO: 93 FL (ref 82–98)
MONOCYTES # BLD AUTO: 0.36 THOUSAND/ÂΜL (ref 0.17–1.22)
MONOCYTES NFR BLD AUTO: 8 % (ref 4–12)
NEUTROPHILS # BLD AUTO: 2.77 THOUSANDS/ÂΜL (ref 1.85–7.62)
NEUTS SEG NFR BLD AUTO: 60 % (ref 43–75)
NRBC BLD AUTO-RTO: 0 /100 WBCS
PLATELET # BLD AUTO: 250 THOUSANDS/UL (ref 149–390)
PMV BLD AUTO: 10.7 FL (ref 8.9–12.7)
POTASSIUM SERPL-SCNC: 3.9 MMOL/L (ref 3.5–5.3)
PROT SERPL-MCNC: 6.6 G/DL (ref 6.4–8.4)
RBC # BLD AUTO: 4.69 MILLION/UL (ref 3.81–5.12)
SODIUM SERPL-SCNC: 143 MMOL/L (ref 135–147)
WBC # BLD AUTO: 4.66 THOUSAND/UL (ref 4.31–10.16)

## 2024-05-07 PROCEDURE — 86258 DGP ANTIBODY EACH IG CLASS: CPT

## 2024-05-07 PROCEDURE — 83690 ASSAY OF LIPASE: CPT

## 2024-05-07 PROCEDURE — 86231 EMA EACH IG CLASS: CPT

## 2024-05-07 PROCEDURE — 80053 COMPREHEN METABOLIC PANEL: CPT

## 2024-05-07 PROCEDURE — 36415 COLL VENOUS BLD VENIPUNCTURE: CPT

## 2024-05-07 PROCEDURE — 86364 TISS TRNSGLTMNASE EA IG CLAS: CPT

## 2024-05-07 PROCEDURE — 82784 ASSAY IGA/IGD/IGG/IGM EACH: CPT

## 2024-05-07 PROCEDURE — 85025 COMPLETE CBC W/AUTO DIFF WBC: CPT

## 2024-05-07 PROCEDURE — 76700 US EXAM ABDOM COMPLETE: CPT

## 2024-05-08 LAB
ENDOMYSIUM IGA SER QL: NEGATIVE
GLIADIN PEPTIDE IGA SER-ACNC: 4 UNITS (ref 0–19)
GLIADIN PEPTIDE IGG SER-ACNC: 2 UNITS (ref 0–19)
IGA SERPL-MCNC: 205 MG/DL (ref 87–352)
TTG IGA SER-ACNC: <2 U/ML (ref 0–3)
TTG IGG SER-ACNC: <2 U/ML (ref 0–5)

## 2024-05-29 ENCOUNTER — HOSPITAL ENCOUNTER (OUTPATIENT)
Dept: BONE DENSITY | Facility: MEDICAL CENTER | Age: 69
Discharge: HOME | End: 2024-05-29
Payer: MEDICARE

## 2024-05-29 DIAGNOSIS — Z78.0 POST-MENOPAUSAL: ICD-10-CM

## 2024-05-29 DIAGNOSIS — M81.0 OSTEOPOROSIS WITHOUT CURRENT PATHOLOGICAL FRACTURE, UNSPECIFIED OSTEOPOROSIS TYPE: ICD-10-CM

## 2024-05-29 PROCEDURE — 77080 DXA BONE DENSITY AXIAL: CPT

## 2024-06-07 DIAGNOSIS — M81.0 AGE-RELATED OSTEOPOROSIS WITHOUT CURRENT PATHOLOGICAL FRACTURE: Primary | ICD-10-CM

## 2024-10-29 ENCOUNTER — OFFICE VISIT (OUTPATIENT)
Dept: FAMILY MEDICINE CLINIC | Facility: CLINIC | Age: 69
End: 2024-10-29
Payer: MEDICARE

## 2024-10-29 VITALS
HEART RATE: 68 BPM | WEIGHT: 188.2 LBS | DIASTOLIC BLOOD PRESSURE: 74 MMHG | BODY MASS INDEX: 31.36 KG/M2 | OXYGEN SATURATION: 96 % | TEMPERATURE: 97.6 F | HEIGHT: 65 IN | SYSTOLIC BLOOD PRESSURE: 130 MMHG

## 2024-10-29 DIAGNOSIS — Z00.00 MEDICARE ANNUAL WELLNESS VISIT, SUBSEQUENT: ICD-10-CM

## 2024-10-29 DIAGNOSIS — Z23 ENCOUNTER FOR IMMUNIZATION: Primary | ICD-10-CM

## 2024-10-29 DIAGNOSIS — G35 MULTIPLE SCLEROSIS (HCC): ICD-10-CM

## 2024-10-29 DIAGNOSIS — Z12.31 ENCOUNTER FOR SCREENING MAMMOGRAM FOR MALIGNANT NEOPLASM OF BREAST: ICD-10-CM

## 2024-10-29 PROCEDURE — G0439 PPPS, SUBSEQ VISIT: HCPCS | Performed by: FAMILY MEDICINE

## 2024-10-29 PROCEDURE — 99213 OFFICE O/P EST LOW 20 MIN: CPT | Performed by: FAMILY MEDICINE

## 2024-10-29 PROCEDURE — G0008 ADMIN INFLUENZA VIRUS VAC: HCPCS

## 2024-10-29 PROCEDURE — 90662 IIV NO PRSV INCREASED AG IM: CPT

## 2024-10-29 NOTE — PROGRESS NOTES
Ambulatory Visit  Name: Tanner Akbar      : 1955      MRN: [id number]  Encounter Provider: Logan Fleming DO  Encounter Date: 10/29/2024   Encounter department: [facility]    Assessment & Plan  Multiple sclerosis (HCC)  Patient is clinically stable.  She does follow with neurology regularly.  She has been using naproxen for pain relief.  Will continue with his current treatment.  Orders:    naproxen (NAPROSYN) 375 mg tablet; Take 1 tablet (375 mg total) by mouth 2 (two) times a day with meals    Encounter for immunization    Orders:    influenza vaccine, high-dose, PF 0.5 mL (Fluzone High Dose)    Medicare annual wellness visit, subsequent         Encounter for screening mammogram for malignant neoplasm of breast    Orders:    Mammo screening bilateral w 3d and cad; Future       Preventive health issues were discussed with patient, and age appropriate screening tests were ordered as noted in patient's After Visit Summary. Personalized health advice and appropriate referrals for health education or preventive services given if needed, as noted in patient's After Visit Summary.    History of Present Illness     HPI   Patient is a 69-year-old female presents today for a follow-up on her chronic conditions.  She has MS.  She has been taking her medication intermittently.  She is been using naproxen for her pain relief.  She does follow with neurology regularly.    Patient Care Team:  Xu Huang DO as PCP - General    Review of Systems   Constitutional:  Negative for activity change, chills, fatigue and fever.   HENT:  Negative for congestion, ear pain, sinus pressure and sore throat.    Eyes:  Negative for redness, itching and visual disturbance.   Respiratory:  Negative for cough and shortness of breath.    Cardiovascular:  Negative for chest pain and palpitations.   Gastrointestinal:  Negative for abdominal pain, diarrhea and nausea.   Endocrine: Negative for cold  intolerance and heat intolerance.   Genitourinary:  Negative for dysuria, flank pain and frequency.   Musculoskeletal:  Negative for arthralgias, back pain, gait problem and myalgias.   Skin:  Negative for color change.   Allergic/Immunologic: Negative for environmental allergies.   Neurological:  Negative for dizziness, numbness and headaches.   Psychiatric/Behavioral:  Negative for behavioral problems and sleep disturbance.      Medical History Reviewed by provider this encounter:       Annual Wellness Visit Questionnaire   Tanner is here for her Subsequent Wellness visit. Last Medicare Wellness visit information reviewed, patient interviewed, no change since last AWV.     Health Risk Assessment:   Patient rates overall health as very good. Patient feels that their physical health rating is same. Patient is very satisfied with their life. Eyesight was rated as slightly worse. Hearing was rated as same. Patient feels that their emotional and mental health rating is same. Patients states they are sometimes angry. Patient states they are sometimes unusually tired/fatigued. Pain experienced in the last 7 days has been some. Patient's pain rating has been 6/10. Patient states that she has experienced no weight loss or gain in last 6 months.     Depression Screening:   PHQ-2 Score: 0      Fall Risk Screening:   In the past year, patient has experienced: history of falling in past year    Number of falls: 2 or more  Injured during fall?: No    Feels unsteady when standing or walking?: Yes    Worried about falling?: Yes      Urinary Incontinence Screening:   Patient has leaked urine accidently in the last six months.     Home Safety:  Patient has trouble with stairs inside or outside of their home. Patient has working smoke alarms and has working carbon monoxide detector. Home safety hazards include: none.     Nutrition:   Current diet is Regular.     Medications:   Patient is currently taking over-the-counter  supplements. OTC medications include: see medication list. Patient is able to manage medications.     Activities of Daily Living (ADLs)/Instrumental Activities of Daily Living (IADLs):   Walk and transfer into and out of bed and chair?: Yes  Dress and groom yourself?: Yes    Bathe or shower yourself?: Yes    Feed yourself? Yes  Do your laundry/housekeeping?: Yes  Manage your money, pay your bills and track your expenses?: Yes  Make your own meals?: Yes    Do your own shopping?: Yes    Previous Hospitalizations:   Any hospitalizations or ED visits within the last 12 months?: No      Advance Care Planning:   Living will: Yes    Durable POA for healthcare: Yes    Advanced directive: Yes    Advanced directive counseling given: Yes    ACP document given: Yes    Patient declined ACP directive: No    End of Life Decisions reviewed with patient: Yes    Provider agrees with end of life decisions: Yes      Cognitive Screening:   Provider or family/friend/caregiver concerned regarding cognition?: No    PREVENTIVE SCREENINGS      Cardiovascular Screening:    General: Screening Current and Risks and Benefits Discussed      Diabetes Screening:     General: Screening Current and Risks and Benefits Discussed      Colorectal Cancer Screening:     General: Screening Current      Breast Cancer Screening:     General: Screening Current and Risks and Benefits Discussed      Cervical Cancer Screening:    General: Screening Not Indicated, Risks and Benefits Discussed and Screening Current      Osteoporosis Screening:    General: Screening Not Indicated, History Osteoporosis, Risks and Benefits Discussed and Screening Current      Abdominal Aortic Aneurysm (AAA) Screening:        General: Risks and Benefits Discussed and Screening Not Indicated      Lung Cancer Screening:     General: Screening Not Indicated and Risks and Benefits Discussed      Hepatitis C Screening:    General: Screening Current and Risks and Benefits  "Discussed    Screening, Brief Intervention, and Referral to Treatment (SBIRT)    Screening  Typical number of drinks in a day: 0  Typical number of drinks in a week: 0  Interpretation: Low risk drinking behavior.    AUDIT-C Screenin) How often did you have a drink containing alcohol in the past year? monthly or less  2) How many drinks did you have on a typical day when you were drinking in the past year? 1 to 2  3) How often did you have 6 or more drinks on one occasion in the past year? never    AUDIT-C Score: 1  Interpretation: Score 0-2 (female): Negative screen for alcohol misuse    Single Item Drug Screening:  How often have you used an illegal drug (including marijuana) or a prescription medication for non-medical reasons in the past year? never    Single Item Drug Screen Score: 0  Interpretation: Negative screen for possible drug use disorder    Social Determinants of Health     Financial Resource Strain: Low Risk  (2023)    Overall Financial Resource Strain (CARDIA)     Difficulty of Paying Living Expenses: Not hard at all   Food Insecurity: No Food Insecurity (10/29/2024)    Hunger Vital Sign     Worried About Running Out of Food in the Last Year: Never true     Ran Out of Food in the Last Year: Never true   Transportation Needs: No Transportation Needs (10/29/2024)    PRAPARE - Transportation     Lack of Transportation (Medical): No     Lack of Transportation (Non-Medical): No   Housing Stability: Low Risk  (10/29/2024)    Housing Stability Vital Sign     Unable to Pay for Housing in the Last Year: No     Number of Times Moved in the Last Year: 0     Homeless in the Last Year: No   Utilities: Not At Risk (10/29/2024)    [facility] Utilities     Threatened with loss of utilities: No     No results found.    Objective     /74 (BP Location: Left arm, Patient Position: Sitting, Cuff Size: Large)   Pulse 68   Temp 97.6 °F (36.4 °C) (Temporal)   Ht 5' 5\" (1.651 m)   Wt 85.4 kg (188 lb 3.2 oz)   LMP "  (LMP Unknown)   SpO2 96%   BMI 31.32 kg/m²     Physical Exam  Vitals reviewed.   Constitutional:       General: She is not in acute distress.     Appearance: Normal appearance. She is well-developed.   HENT:      Head: Normocephalic and atraumatic.      Right Ear: Tympanic membrane, ear canal and external ear normal. There is no impacted cerumen.      Left Ear: Tympanic membrane, ear canal and external ear normal. There is no impacted cerumen.      Nose: Nose normal. No congestion or rhinorrhea.      Mouth/Throat:      Mouth: Mucous membranes are moist.      Pharynx: No oropharyngeal exudate or posterior oropharyngeal erythema.   Eyes:      General: No scleral icterus.        Right eye: No discharge.         Left eye: No discharge.      Extraocular Movements: Extraocular movements intact.      Conjunctiva/sclera: Conjunctivae normal.      Pupils: Pupils are equal, round, and reactive to light.   Neck:      Trachea: No tracheal deviation.   Cardiovascular:      Rate and Rhythm: Normal rate and regular rhythm.      Pulses: Normal pulses.           Dorsalis pedis pulses are 2+ on the right side and 2+ on the left side.        Posterior tibial pulses are 2+ on the right side and 2+ on the left side.      Heart sounds: Normal heart sounds. No murmur heard.     No friction rub. No gallop.   Pulmonary:      Effort: Pulmonary effort is normal. No respiratory distress.      Breath sounds: Normal breath sounds. No wheezing, rhonchi or rales.   Abdominal:      General: Bowel sounds are normal. There is no distension.      Palpations: Abdomen is soft.      Tenderness: There is no abdominal tenderness. There is no guarding or rebound.   Musculoskeletal:         General: Normal range of motion.      Cervical back: Normal range of motion and neck supple.      Right lower leg: No edema.      Left lower leg: No edema.   Lymphadenopathy:      Head:      Right side of head: No submental or submandibular adenopathy.      Left side  of head: No submental or submandibular adenopathy.      Cervical: No cervical adenopathy.      Right cervical: No superficial, deep or posterior cervical adenopathy.     Left cervical: No superficial, deep or posterior cervical adenopathy.   Skin:     General: Skin is warm and dry.      Findings: No erythema.   Neurological:      General: No focal deficit present.      Mental Status: She is alert and oriented to person, place, and time.      Cranial Nerves: No cranial nerve deficit.      Sensory: Sensation is intact. No sensory deficit.      Motor: Motor function is intact.   Psychiatric:         Attention and Perception: Attention and perception normal.         Mood and Affect: Mood is not anxious or depressed.         Speech: Speech normal.         Behavior: Behavior normal.         Thought Content: Thought content normal.         Judgment: Judgment normal.

## 2024-10-29 NOTE — ASSESSMENT & PLAN NOTE
Patient is clinically stable.  She does follow with neurology regularly.  She has been using naproxen for pain relief.  Will continue with his current treatment.  Orders:    naproxen (NAPROSYN) 375 mg tablet; Take 1 tablet (375 mg total) by mouth 2 (two) times a day with meals

## 2024-10-29 NOTE — PATIENT INSTRUCTIONS
Medicare Preventive Visit Patient Instructions  Thank you for completing your Welcome to Medicare Visit or Medicare Annual Wellness Visit today. Your next wellness visit will be due in one year (10/30/2025).  The screening/preventive services that you may require over the next 5-10 years are detailed below. Some tests may not apply to you based off risk factors and/or age. Screening tests ordered at today's visit but not completed yet may show as past due. Also, please note that scanned in results may not display below.  Preventive Screenings:  Service Recommendations Previous Testing/Comments   Colorectal Cancer Screening  * Colonoscopy    * Fecal Occult Blood Test (FOBT)/Fecal Immunochemical Test (FIT)  * Fecal DNA/Cologuard Test  * Flexible Sigmoidoscopy Age: 45-75 years old   Colonoscopy: every 10 years (may be performed more frequently if at higher risk)  OR  FOBT/FIT: every 1 year  OR  Cologuard: every 3 years  OR  Sigmoidoscopy: every 5 years  Screening may be recommended earlier than age 45 if at higher risk for colorectal cancer. Also, an individualized decision between you and your healthcare provider will decide whether screening between the ages of 76-85 would be appropriate. Colonoscopy: 09/24/2021  FOBT/FIT: Not on file  Cologuard: Not on file  Sigmoidoscopy: Not on file    Screening Current     Breast Cancer Screening Age: 40+ years old  Frequency: every 1-2 years  Not required if history of left and right mastectomy Mammogram: 09/13/2023    Screening Current   Cervical Cancer Screening Between the ages of 21-29, pap smear recommended once every 3 years.   Between the ages of 30-65, can perform pap smear with HPV co-testing every 5 years.   Recommendations may differ for women with a history of total hysterectomy, cervical cancer, or abnormal pap smears in past. Pap Smear: 10/29/2021    Screening Not Indicated   Hepatitis C Screening Once for adults born between 1945 and 1965  More frequently in  patients at high risk for Hepatitis C Hep C Antibody: 06/18/2021    Screening Current   Diabetes Screening 1-2 times per year if you're at risk for diabetes or have pre-diabetes Fasting glucose: 83 mg/dL (5/7/2024)  A1C: 5.5 (9/13/2023)  Screening Current   Cholesterol Screening Once every 5 years if you don't have a lipid disorder. May order more often based on risk factors. Lipid panel: 08/16/2023    Screening Current     Other Preventive Screenings Covered by Medicare:  Abdominal Aortic Aneurysm (AAA) Screening: covered once if your at risk. You're considered to be at risk if you have a family history of AAA.  Lung Cancer Screening: covers low dose CT scan once per year if you meet all of the following conditions: (1) Age 55-77; (2) No signs or symptoms of lung cancer; (3) Current smoker or have quit smoking within the last 15 years; (4) You have a tobacco smoking history of at least 20 pack years (packs per day multiplied by number of years you smoked); (5) You get a written order from a healthcare provider.  Glaucoma Screening: covered annually if you're considered high risk: (1) You have diabetes OR (2) Family history of glaucoma OR (3)  aged 50 and older OR (4)  American aged 65 and older  Osteoporosis Screening: covered every 2 years if you meet one of the following conditions: (1) You're estrogen deficient and at risk for osteoporosis based off medical history and other findings; (2) Have a vertebral abnormality; (3) On glucocorticoid therapy for more than 3 months; (4) Have primary hyperparathyroidism; (5) On osteoporosis medications and need to assess response to drug therapy.   Last bone density test (DXA Scan): 06/03/2024.  HIV Screening: covered annually if you're between the age of 15-65. Also covered annually if you are younger than 15 and older than 65 with risk factors for HIV infection. For pregnant patients, it is covered up to 3 times per  pregnancy.    Immunizations:  Immunization Recommendations   Influenza Vaccine Annual influenza vaccination during flu season is recommended for all persons aged >= 6 months who do not have contraindications   Pneumococcal Vaccine   * Pneumococcal conjugate vaccine = PCV13 (Prevnar 13), PCV15 (Vaxneuvance), PCV20 (Prevnar 20)  * Pneumococcal polysaccharide vaccine = PPSV23 (Pneumovax) Adults 19-63 yo with certain risk factors or if 65+ yo  If never received any pneumonia vaccine: recommend Prevnar 20 (PCV20)  Give PCV20 if previously received 1 dose of PCV13 or PPSV23   Hepatitis B Vaccine 3 dose series if at intermediate or high risk (ex: diabetes, end stage renal disease, liver disease)   Respiratory syncytial virus (RSV) Vaccine - COVERED BY MEDICARE PART D  * RSVPreF3 (Arexvy) CDC recommends that adults 60 years of age and older may receive a single dose of RSV vaccine using shared clinical decision-making (SCDM)   Tetanus (Td) Vaccine - COST NOT COVERED BY MEDICARE PART B Following completion of primary series, a booster dose should be given every 10 years to maintain immunity against tetanus. Td may also be given as tetanus wound prophylaxis.   Tdap Vaccine - COST NOT COVERED BY MEDICARE PART B Recommended at least once for all adults. For pregnant patients, recommended with each pregnancy.   Shingles Vaccine (Shingrix) - COST NOT COVERED BY MEDICARE PART B  2 shot series recommended in those 19 years and older who have or will have weakened immune systems or those 50 years and older     Health Maintenance Due:      Topic Date Due   • Breast Cancer Screening: Mammogram  09/13/2024   • Colorectal Cancer Screening  09/24/2031   • Hepatitis C Screening  Completed     Immunizations Due:      Topic Date Due   • Influenza Vaccine (1) 09/01/2024   • COVID-19 Vaccine (7 - 2023-24 season) 09/01/2024     Advance Directives   What are advance directives?  Advance directives are legal documents that state your wishes and  plans for medical care. These plans are made ahead of time in case you lose your ability to make decisions for yourself. Advance directives can apply to any medical decision, such as the treatments you want, and if you want to donate organs.   What are the types of advance directives?  There are many types of advance directives, and each state has rules about how to use them. You may choose a combination of any of the following:  Living will:  This is a written record of the treatment you want. You can also choose which treatments you do not want, which to limit, and which to stop at a certain time. This includes surgery, medicine, IV fluid, and tube feedings.   Durable power of  for healthcare (DPAHC):  This is a written record that states who you want to make healthcare choices for you when you are unable to make them for yourself. This person, called a proxy, is usually a family member or a friend. You may choose more than 1 proxy.  Do not resuscitate (DNR) order:  A DNR order is used in case your heart stops beating or you stop breathing. It is a request not to have certain forms of treatment, such as CPR. A DNR order may be included in other types of advance directives.  Medical directive:  This covers the care that you want if you are in a coma, near death, or unable to make decisions for yourself. You can list the treatments you want for each condition. Treatment may include pain medicine, surgery, blood transfusions, dialysis, IV or tube feedings, and a ventilator (breathing machine).  Values history:  This document has questions about your views, beliefs, and how you feel and think about life. This information can help others choose the care that you would choose.  Why are advance directives important?  An advance directive helps you control your care. Although spoken wishes may be used, it is better to have your wishes written down. Spoken wishes can be misunderstood, or not followed. Treatments  may be given even if you do not want them. An advance directive may make it easier for your family to make difficult choices about your care.   Fall Prevention    Fall prevention  includes ways to make your home and other areas safer. It also includes ways you can move more carefully to prevent a fall. Health conditions that cause changes in your blood pressure, vision, or muscle strength and coordination may increase your risk for falls. Medicines may also increase your risk for falls if they make you dizzy, weak, or sleepy.   Fall prevention tips:   Stand or sit up slowly.    Use assistive devices as directed.    Wear shoes that fit well and have soles that .    Wear a personal alarm.    Stay active.    Manage your medical conditions.    Home Safety Tips:  Add items to prevent falls in the bathroom.    Keep paths clear.    Install bright lights in your home.    Keep items you use often on shelves within reach.    Paint or place reflective tape on the edges of your stairs.    Urinary Incontinence   Urinary incontinence (UI)  is when you lose control of your bladder. UI develops because your bladder cannot store or empty urine properly. The 3 most common types of UI are stress incontinence, urge incontinence, or both.  Medicines:   May be given to help strengthen your bladder control. Report any side effects of medication to your healthcare provider.  Do pelvic muscle exercises often:  Your pelvic muscles help you stop urinating. Squeeze these muscles tight for 5 seconds, then relax for 5 seconds. Gradually work up to squeezing for 10 seconds. Do 3 sets of 15 repetitions a day, or as directed. This will help strengthen your pelvic muscles and improve bladder control.  Train your bladder:  Go to the bathroom at set times, such as every 2 hours, even if you do not feel the urge to go. You can also try to hold your urine when you feel the urge to go. For example, hold your urine for 5 minutes when you feel the urge  to go. As that becomes easier, hold your urine for 10 minutes.   Self-care:   Keep a UI record.  Write down how often you leak urine and how much you leak. Make a note of what you were doing when you leaked urine.  Drink liquids as directed. You may need to limit the amount of liquid you drink to help control your urine leakage. Do not drink any liquid right before you go to bed. Limit or do not have drinks that contain caffeine or alcohol.   Prevent constipation.  Eat a variety of high-fiber foods. Good examples are high-fiber cereals, beans, vegetables, and whole-grain breads. Walking is the best way to trigger your intestines to have a bowel movement.  Exercise regularly and maintain a healthy weight.  Weight loss and exercise will decrease pressure on your bladder and help you control your leakage.   Use a catheter as directed  to help empty your bladder. A catheter is a tiny, plastic tube that is put into your bladder to drain your urine.   Go to behavior therapy as directed.  Behavior therapy may be used to help you learn to control your urge to urinate.     © Copyright [employer] 2018 Information is for End User's use only and may not be sold, redistributed or otherwise used for commercial purposes. All illustrations and images included in CareNotes® are the copyrighted property of [email or web address] or [employer]

## 2025-01-31 ENCOUNTER — OFFICE VISIT (OUTPATIENT)
Dept: URGENT CARE | Facility: MEDICAL CENTER | Age: 70
End: 2025-01-31
Payer: MEDICARE

## 2025-01-31 ENCOUNTER — RESULTS FOLLOW-UP (OUTPATIENT)
Dept: URGENT CARE | Facility: MEDICAL CENTER | Age: 70
End: 2025-01-31

## 2025-01-31 ENCOUNTER — APPOINTMENT (OUTPATIENT)
Dept: RADIOLOGY | Facility: MEDICAL CENTER | Age: 70
End: 2025-01-31
Payer: MEDICARE

## 2025-01-31 VITALS
DIASTOLIC BLOOD PRESSURE: 80 MMHG | HEIGHT: 66 IN | TEMPERATURE: 98.4 F | OXYGEN SATURATION: 95 % | SYSTOLIC BLOOD PRESSURE: 139 MMHG | RESPIRATION RATE: 18 BRPM | WEIGHT: 184.4 LBS | HEART RATE: 89 BPM | BODY MASS INDEX: 29.63 KG/M2

## 2025-01-31 DIAGNOSIS — R05.2 SUBACUTE COUGH: Primary | ICD-10-CM

## 2025-01-31 PROCEDURE — 71046 X-RAY EXAM CHEST 2 VIEWS: CPT

## 2025-01-31 PROCEDURE — 99214 OFFICE O/P EST MOD 30 MIN: CPT

## 2025-01-31 PROCEDURE — G0463 HOSPITAL OUTPT CLINIC VISIT: HCPCS

## 2025-01-31 RX ORDER — ALBUTEROL SULFATE 90 UG/1
1-2 INHALANT RESPIRATORY (INHALATION) EVERY 6 HOURS PRN
COMMUNITY
Start: 2025-01-08 | End: 2025-02-07

## 2025-01-31 NOTE — PROGRESS NOTES
[facility]        NAME: Tanner Akbar is a 69 y.o. female  : 1955    MRN: [id number]  DATE: 2025  TIME: 3:26 PM    Assessment and Plan   Acute cough [R05.1]  1. Acute cough  XR chest pa and lateral    XR chest pa and lateral        [facility] - patient prescribed albuterol inhaler, Tessalon.    CXR: Abnormality in left lung noted on preliminary read, radiology to determine final read.    No signs of bacterial infection or abnormal lung sounds noted today on examination.  Patient has already been on albuterol inhaler, Tessalon, azithromycin, and prednisone.  Advised continuing with symptomatic treatment as needed and follow up with PCP for further evaluation and testing to determine cause of cough.    Patient Instructions     Your examination did not show signs of a bacterial infection today.  Recommend symptomatic treatment as below.  Call your PCP to schedule follow up for further testing, as your cough has been going on for an extended period of time.  Fever/Pain: Over the counter Tylenol and/or Motrin as directed on packaging.  Cough: Mucinex can help to thin mucus, making it easier to cough up. Delsym or Robitussin can help to suppress the cough. Utilizing a vaporizer/humidifier may help, as well as increasing fluids.  Sore Throat: Salt water gargles with 1 teaspoon of salt dissolved in 6-8 oz of water, honey, drinking plenty of liquids, eating soft foods. If severe, can utilize OTC chloraseptic spray.  Nasal Congestion: Over the counter allergy medication like Claritin, Allegra or Zyrtec can help with nasal congestion and post nasal drip.  Over the counter saline or steroid nasal sprays like Flonase can help with nasal congestion and post nasal drip as well. Over the counter decongestants such as Sudafed may also help.  Upset Stomach: Drink plenty of fluids. May utilize Gatorade, Pedialyte, or other electrolyte solutions to  supplement. Eat bland foods (ex: BRAT - bananas, rice, applesauce, toast) and slowly advance to other foods as tolerated.  Please note, if you have heart issues or high blood pressure, the cough/cold medication of choice is Coricidin. Talk to your doctor before trying any new medications.    Follow up with PCP in 3-5 days.  Proceed to  ER if symptoms worsen.    If tests are performed, our office will contact you with results only if changes need to made to the care plan discussed with you at the visit. You can review your full results on [facility].    Chief Complaint     Chief Complaint   Patient presents with    Cough     Pt states she has had this cough for 4 weeks. She has been treated with inhalers, tessalon perles, Azithromycin, and prednisone.  But she continues to have the cough.          History of Present Illness       Patient presents with  for evaluation of cough.  Notes her cough has been present for the past 4 weeks.  Her  recently started with a cough within the past few days, notes daughter also sick with similar symptoms.  On 1/8/25 she had a telemedicine visit and was prescribed albuterol inhaler, Tessalon.  States on 1/15/25 she was evaluated while on vacation in [facility] and prescribed azithromycin and prednisone.  Since being off the azithromycin and prednisone in the past 10 days she notes her symptoms have worsened again.  Denies history of respiratory or lung issues.    Cough  This is a new problem. The current episode started 1 to 4 weeks ago. The problem has been waxing and waning. Cough characteristics: occasionally productive of clear sputum. Associated symptoms include postnasal drip. Pertinent negatives include no chills, ear pain, eye redness, fever, hemoptysis, myalgias, rash, rhinorrhea, sore throat, shortness of breath or wheezing. Treatments tried: albuterol inhaler, Tessalon, azithromycin, prednisone, Mucinex.       Review of Systems   Review of Systems    Constitutional:  Positive for appetite change (decreased) and fatigue. Negative for chills and fever.   HENT:  Positive for postnasal drip. Negative for congestion, ear discharge, ear pain, rhinorrhea, sinus pressure, sinus pain and sore throat.    Eyes:  Negative for pain, discharge, redness and itching.   Respiratory:  Positive for cough. Negative for hemoptysis, chest tightness, shortness of breath, wheezing and stridor.    Gastrointestinal:  Positive for diarrhea (loose stools x 2 days). Negative for abdominal pain, nausea and vomiting.   Musculoskeletal:  Negative for myalgias.   Skin:  Negative for rash.         Current Medications       Current Outpatient Medications:     albuterol (PROVENTIL HFA,VENTOLIN HFA) 90 mcg/act inhaler, Inhale 1-2 puffs every 6 (six) hours as needed, Disp: , Rfl:     Ascorbic Acid (VITAMIN C, ACEROLA,) 500 MG tablet, Take 500 mg by mouth, Disp: , Rfl:     calcium carbonate 1250 MG capsule, Take 500 mg by mouth, Disp: , Rfl:     Cholecalciferol 5000 units capsule, Take 10,000 Units by mouth, Disp: , Rfl:     cyanocobalamin (VITAMIN B-12) 100 mcg tablet, 1 cap po daily, Disp: , Rfl:     ferrous sulfate 325 (65 Fe) mg tablet, daily, Disp: , Rfl:     gabapentin (NEURONTIN) 300 mg capsule, Take 300 mg by mouth, Disp: , Rfl:     Glucosamine-Chondroitin 750-600 MG TABS, Take by mouth, Disp: , Rfl:     Magnesium 100 MG CAPS, Take by mouth, Disp: , Rfl:     naproxen (NAPROSYN) 375 mg tablet, Take 1 tablet (375 mg total) by mouth 2 (two) times a day with meals, Disp: 180 tablet, Rfl: 1    naratriptan (AMERGE) 2.5 MG tablet, Take 2.5 mg by mouth, Disp: , Rfl:     Omega-3 Fatty Acids (FISH OIL PO), Take by mouth, Disp: , Rfl:     TURMERIC PO, Take by mouth, Disp: , Rfl:     valACYclovir (VALTREX) 1,000 mg tablet, Take 1 tablet (1,000 mg total) by mouth 2 (two) times a day for 15 days Take for 5 days for cold sores, Disp: 10 tablet, Rfl: 2    Vitamin E Acetate LIQD, daily, Disp: , Rfl:      APPLE CIDER VINEGAR PO, Take by mouth (Patient not taking: Reported on 8/17/2022), Disp: , Rfl:     NON FORMULARY, Take 1 Units by mouth daily (Patient not taking: Reported on 1/31/2025), Disp: , Rfl:     Current Allergies     Allergies as of 01/31/2025 - Reviewed 01/31/2025   Allergen Reaction Noted    Penicillins Rash 01/28/2014            The following portions of the patient's history were reviewed and updated as appropriate: allergies, current medications, past family history, past medical history, past social history, past surgical history and problem list.     Past Medical History:   Diagnosis Date    Allergic 1965    always  been sensitive to penicillin, I  get a rash    Headache(784.0) 1990    HL (hearing loss) 2020    Multiple sclerosis (HCC)     Vaginal Pap smear 10/11/2017    WNL    Visual impairment 1994    glasses       Past Surgical History:   Procedure Laterality Date    BLADDER SURGERY      bladder lift    COLONOSCOPY  09/2004    normal    HYSTERECTOMY      HYSTERECTOMY W/ SALPINGO-OOPHERECTOMY  08/2012    laproscopic    JOINT REPLACEMENT  Spring 2014    Right knee    KNEE SURGERY Right     & Left knee     OOPHORECTOMY Bilateral     age 57    REPLACEMENT TOTAL KNEE Right        Family History   Problem Relation Age of Onset    Osteoarthritis Mother     Stroke Mother     Asthma Mother     Hearing loss Mother     Vision loss Mother     Ulcers Father     Lung cancer Father     Alcohol abuse Father     Breast cancer Father     Cancer Father         Lung Cancer    Hearing loss Father     Vision loss Father     Hypertension Brother     No Known Problems Sister     No Known Problems Daughter     No Known Problems Maternal Grandmother     No Known Problems Maternal Grandfather     No Known Problems Paternal Grandmother     No Known Problems Paternal Grandfather     No Known Problems Brother     No Known Problems Maternal Aunt     Breast cancer Maternal Aunt 50    No Known Problems Maternal Aunt     No Known  "Problems Maternal Aunt     No Known Problems Paternal Aunt     No Known Problems Paternal Aunt     Mental illness Daughter         [employer]         Medications have been verified.        Objective   /80 (BP Location: Right arm, Patient Position: Sitting)   Pulse 89   Temp 98.4 °F (36.9 °C) (Tympanic)   Resp 18   Ht 5' 6\" (1.676 m)   Wt 83.6 kg (184 lb 6.4 oz)   LMP  (LMP Unknown)   SpO2 95%   BMI 29.76 kg/m²        Physical Exam     Physical Exam  Vitals and nursing note reviewed.   Constitutional:       General: She is not in acute distress.     Appearance: Normal appearance. She is not ill-appearing.   HENT:      Right Ear: Tympanic membrane, ear canal and external ear normal.      Left Ear: Tympanic membrane, ear canal and external ear normal.      Nose: Nose normal. No congestion or rhinorrhea.      Mouth/Throat:      Mouth: Mucous membranes are moist.      Pharynx: No oropharyngeal exudate or posterior oropharyngeal erythema.   Eyes:      General:         Right eye: No discharge.         Left eye: No discharge.      Extraocular Movements: Extraocular movements intact.   Cardiovascular:      Rate and Rhythm: Normal rate and regular rhythm.      Pulses: Normal pulses.      Heart sounds: Normal heart sounds.   Pulmonary:      Effort: Pulmonary effort is normal. No respiratory distress.      Breath sounds: Normal breath sounds. No wheezing, rhonchi or rales.   Abdominal:      General: Abdomen is flat. Bowel sounds are normal. There is no distension.      Palpations: Abdomen is soft.      Tenderness: There is no abdominal tenderness. There is no guarding.   Musculoskeletal:      Cervical back: Neck supple.   Lymphadenopathy:      Cervical: No cervical adenopathy.   Skin:     General: Skin is warm and dry.   Neurological:      Mental Status: She is alert.                   "

## 2025-01-31 NOTE — PATIENT INSTRUCTIONS
"Your examination did not show signs of a bacterial infection today.  Recommend symptomatic treatment as below.  Call your PCP to schedule follow up for further testing, as your cough has been going on for an extended period of time.  Fever/Pain: Over the counter Tylenol and/or Motrin as directed on packaging.  Cough: Mucinex can help to thin mucus, making it easier to cough up. Delsym or Robitussin can help to suppress the cough. Utilizing a vaporizer/humidifier may help, as well as increasing fluids.  Sore Throat: Salt water gargles with 1 teaspoon of salt dissolved in 6-8 oz of water, honey, drinking plenty of liquids, eating soft foods. If severe, can utilize OTC chloraseptic spray.  Nasal Congestion: Over the counter allergy medication like Claritin, Allegra or Zyrtec can help with nasal congestion and post nasal drip.  Over the counter saline or steroid nasal sprays like Flonase can help with nasal congestion and post nasal drip as well. Over the counter decongestants such as Sudafed may also help.  Upset Stomach: Drink plenty of fluids. May utilize Gatorade, Pedialyte, or other electrolyte solutions to supplement. Eat bland foods (ex: BRAT - bananas, rice, applesauce, toast) and slowly advance to other foods as tolerated.  Please note, if you have heart issues or high blood pressure, the cough/cold medication of choice is Coricidin. Talk to your doctor before trying any new medications.    Follow up with PCP in 3-5 days.  Proceed to  ER if symptoms worsen.    If tests are performed, our office will contact you with results only if changes need to made to the care plan discussed with you at the visit. You can review your full results on [facility].    Patient Education     Cough in adults   The Basics   Written by the doctors and editors at [employer]   What is a cough? -- A cough is an important reflex that helps clear out the body's airways. The airways include the windpipe, or \"trachea,\" and the " "bronchi, which are the tubes that carry air within the lungs. Coughing helps keep people from breathing things into the airways and lungs, which could cause problems (figure 1).  It is normal for people to cough once in a while. But sometimes, a cough is a symptom of an illness or condition.  Some coughs are called \"dry\" coughs, because they don't bring up mucus (phlegm). Other coughs are called \"wet\" or \"productive\" coughs, because they do bring up mucus. Some coughs are mild and don't cause serious problems. Other coughs are severe and can cause trouble breathing.  What causes a cough? -- In adults, common causes of a cough include:   Viral infections - These include the common cold, the flu, and COVID-19. Usually, a cough caused by a viral infection will only last for a week or 2, but sometimes, it can last longer.   Smoking cigarettes or vaping   Postnasal drip - Postnasal drip is when mucus from the nose drips down or flows along the back of the throat. Postnasal drip can happen when people have:   A cold   Allergies   A sinus infection   Lung conditions - Lung problems like asthma and chronic obstructive pulmonary disease (\"COPD\") can make it hard to breathe. COPD is usually caused by smoking.   Acid reflux - Acid reflux is when the acid that is normally in your stomach backs up into your esophagus (the tube that carries food from your mouth to your stomach).   A side effect from blood pressure medicines called \"ACE inhibitors\"  Will I need tests? -- Maybe. If you see a doctor for your cough, they will talk with you and do an exam. Based on your symptoms and other factors, they might decide that you need tests. These might include:   A swab from your inside of your nose - This can be tested for the virus that causes COVID-19.   A chest X-ray   Breathing tests - Breathing tests involve breathing hard into a tube. These tests show how your lungs are working.   Allergy skin tests to find out what you're allergic " "to - For a skin test, the doctor puts a drop of the substance that you might be allergic to on your skin and makes a tiny prick in your skin. Then, they watch your skin to see if it gets red and bumpy.   A CT scan of your chest or sinuses - A CT scan is an imaging test that creates pictures of the inside of the body.   Lab tests on a sample of the mucus that you cough up   Using a \"scope\" to look inside of your nose, sinuses, airway, or lungs   Tests to check for acid reflux - These usually involve having a thin tube put in your mouth and down into your esophagus.  How can I care for myself at home?    If your cough is from a cold, you can use a cool mist humidifier in your bedroom.   Suck on cough drops or hard candy.   Drink warm liquids, like tea, to soothe your throat.   Avoid smoking and places where other people are smoking.   If you have allergies, avoid the things that you are allergic to. This might include pollen, dust, animals, or mold.   If you are coughing up mucus, try an over-the-counter cold and cough medicine. These medicines can thin mucus and sometimes reduce the urge to cough.   If you have acid reflux, your doctor or nurse will talk to you about how to reduce symptoms.  How is a cough treated? -- Treatment depends on the cause of your cough. For example:   Some infections are treated with antibiotic medicines. If an infection is caused by bacteria, doctors can treat it with antibiotics. If the infection is caused by a virus (such as the common cold), antibiotics will not help. For some viral infections, like the flu or COVID-19, there might be other medicines that can help.   Postnasal drip is treated with different kinds of medicines that can come as a pill or nose spray.   Asthma and COPD are usually treated with medicines that people breathe into their lungs. These are called \"inhaler medicines.\"   Acid reflux can be treated with medicine to reduce or block stomach acid.   If you have a cough " "as a side effect from an ACE inhibitor, your doctor can switch your medicine.  If the cause of your cough is not clear, your doctor might prescribe medicine to make your cough less severe. But these medicines have side effects, and doctors usually recommend them only if nothing else has worked.  When should I call the doctor? -- Call your doctor or nurse if:   You have trouble breathing or noisy breathing (wheezing).   You have a fever or chest pain.   You cough up blood, or yellow or green mucus.   You cough so hard that it makes you throw up.   Your cough gets worse or lasts longer than 14 days.   You have a cough and have lost weight without trying to.  All topics are updated as new evidence becomes available and our peer review process is complete.  This topic retrieved from [employer] on: Feb 26, 2024.  Topic 78264 Version 16.0  Release: 32.2.4 - C32.56  © 2024 Up[employer]Date, Inc[employer] and/or its affiliates. All rights reserved.  figure 1: Normal lungs     The lungs sit in the chest, inside the ribcage. They are covered with a thin membrane called the \"pleura.\" The windpipe, or trachea, branches into 2 smaller airways called the left and right \"bronchi.\" The space between the lungs is called the \"mediastinum.\" Lymph nodes are located within and around the lungs and mediastinum.  Graphic 25251 Version 14.0  Consumer Information Use and Disclaimer   Disclaimer: This generalized information is a limited summary of diagnosis, treatment, and/or medication information. It is not meant to be comprehensive and should be used as a tool to help the user understand and/or assess potential diagnostic and treatment options. It does NOT include all information about conditions, treatments, medications, side effects, or risks that may apply to a specific patient. It is not intended to be medical advice or a substitute for the medical advice, diagnosis, or treatment of a health care provider based on the health care provider's examination " and assessment of a patient's specific and unique circumstances. Patients must speak with a health care provider for complete information about their health, medical questions, and treatment options, including any risks or benefits regarding use of medications. This information does not endorse any treatments or medications as safe, effective, or approved for treating a specific patient. Up[employer]Date, Inc[employer] and its affiliates disclaim any warranty or liability relating to this information or the use thereof.The use of this information is governed by the Terms of Use, available at https://www.[employer].com/en/know/clinical-effectiveness-terms. 2024© [employer]Date, Inc. and its affiliates and/or licensors. All rights reserved.  Copyright   © 2024 Up[employer]Date, Inc. and/or its affiliates. All rights reserved.

## 2025-02-03 ENCOUNTER — OFFICE VISIT (OUTPATIENT)
Dept: FAMILY MEDICINE CLINIC | Facility: CLINIC | Age: 70
End: 2025-02-03
Payer: MEDICARE

## 2025-02-03 VITALS
BODY MASS INDEX: 29.57 KG/M2 | HEIGHT: 66 IN | TEMPERATURE: 98.1 F | DIASTOLIC BLOOD PRESSURE: 80 MMHG | SYSTOLIC BLOOD PRESSURE: 124 MMHG | OXYGEN SATURATION: 96 % | WEIGHT: 184 LBS | HEART RATE: 98 BPM

## 2025-02-03 DIAGNOSIS — R05.3 PERSISTENT COUGH FOR 3 WEEKS OR LONGER: Primary | ICD-10-CM

## 2025-02-03 PROCEDURE — G2211 COMPLEX E/M VISIT ADD ON: HCPCS | Performed by: FAMILY MEDICINE

## 2025-02-03 PROCEDURE — 99213 OFFICE O/P EST LOW 20 MIN: CPT | Performed by: FAMILY MEDICINE

## 2025-02-03 RX ORDER — BUDESONIDE AND FORMOTEROL FUMARATE DIHYDRATE 160; 4.5 UG/1; UG/1
2 AEROSOL RESPIRATORY (INHALATION) 2 TIMES DAILY
Qty: 10.2 G | Refills: 1 | Status: SHIPPED | OUTPATIENT
Start: 2025-02-03 | End: 2025-02-05

## 2025-02-03 NOTE — PROGRESS NOTES
Name: Tanner Akbar      : 1955      MRN: [id number]  Encounter Provider: Xu Huang DO  Encounter Date: 2/3/2025   Encounter department: [facility]    Assessment & Plan  Persistent cough for 3 weeks or longer  Reviewed chest x-ray results from urgent care done 3 days ago which is normal.  Discussed her past treatments.  Will treat with Symbicort 2 puffs twice daily  Orders:  •  budesonide-formoterol (Symbicort) 160-4.5 mcg/act inhaler; Inhale 2 puffs 2 (two) times a day Rinse mouth after use.         History of Present Illness     Patient complains of persistent cough.  She was seen by televisit beginning of January and then later at an urgent care and subsequently at an urgent care again 3 days ago.  She has had antibiotics 1 time, steroids and albuterol inhaler along with over-the-counter cough remedies all without eliminating her cough which she describes as dry tickling persistent.  She feels well otherwise      Review of Systems   Constitutional: Negative.    Respiratory:  Positive for cough.    Cardiovascular: Negative.    Gastrointestinal: Negative.    Genitourinary: Negative.    Musculoskeletal: Negative.    Psychiatric/Behavioral: Negative.       Past Medical History:   Diagnosis Date   • Allergic 1965    always  been sensitive to penicillin, I  get a rash   • Headache(784.0)    • HL (hearing loss)    • Multiple sclerosis (HCC)    • Vaginal Pap smear 10/11/2017    WNL   • Visual impairment 1994    glasses     Past Surgical History:   Procedure Laterality Date   • BLADDER SURGERY      bladder lift   • COLONOSCOPY  2004    normal   • HYSTERECTOMY     • HYSTERECTOMY W/ SALPINGO-OOPHERECTOMY  2012    laproscopic   • JOINT REPLACEMENT  Spring 2014    Right knee   • KNEE SURGERY Right     & Left knee    • OOPHORECTOMY Bilateral     age 57   • REPLACEMENT TOTAL KNEE Right      Family History   Problem Relation Age of Onset   • Osteoarthritis Mother    •  Stroke Mother    • Asthma Mother    • Hearing loss Mother    • Vision loss Mother    • Ulcers Father    • Lung cancer Father    • Alcohol abuse Father    • Breast cancer Father    • Cancer Father         Lung Cancer   • Hearing loss Father    • Vision loss Father    • Hypertension Brother    • No Known Problems Sister    • No Known Problems Daughter    • No Known Problems Maternal Grandmother    • No Known Problems Maternal Grandfather    • No Known Problems Paternal Grandmother    • No Known Problems Paternal Grandfather    • No Known Problems Brother    • No Known Problems Maternal Aunt    • Breast cancer Maternal Aunt 50   • No Known Problems Maternal Aunt    • No Known Problems Maternal Aunt    • No Known Problems Paternal Aunt    • No Known Problems Paternal Aunt    • Mental illness Daughter         [employer]     Social History     Tobacco Use   • Smoking status: Former     Types: Cigarettes     Start date:      Quit date:      Years since quittin.1   • Smokeless tobacco: Never   Vaping Use   • Vaping status: Never Used   Substance and Sexual Activity   • Alcohol use: Not Currently     Comment: occasionally   • Drug use: No   • Sexual activity: Not Currently     Partners: Male     Birth control/protection: None     Comment: I did  use the pill before menopause     Current Outpatient Medications on File Prior to Visit   Medication Sig   • albuterol (PROVENTIL HFA,VENTOLIN HFA) 90 mcg/act inhaler Inhale 1-2 puffs every 6 (six) hours as needed   • Ascorbic Acid (VITAMIN C, ACEROLA,) 500 MG tablet Take 500 mg by mouth   • calcium carbonate 1250 MG capsule Take 500 mg by mouth   • Cholecalciferol 5000 units capsule Take 10,000 Units by mouth   • cyanocobalamin (VITAMIN B-12) 100 mcg tablet 1 cap po daily   • ferrous sulfate 325 (65 Fe) mg tablet daily   • gabapentin (NEURONTIN) 300 mg capsule Take 300 mg by mouth   • Glucosamine-Chondroitin 750-600 MG TABS Take by mouth   • guaiFENesin (MUCINEX PO) Take  "1,200 mg by mouth 2 (two) times a day   • Magnesium 100 MG CAPS Take by mouth   • naratriptan (AMERGE) 2.5 MG tablet Take 2.5 mg by mouth   • Omega-3 Fatty Acids (FISH OIL PO) Take by mouth   • TURMERIC PO Take by mouth   • Vitamin E Acetate LIQD daily   • APPLE CIDER VINEGAR PO Take by mouth (Patient not taking: Reported on 8/17/2022)   • naproxen (NAPROSYN) 375 mg tablet Take 1 tablet (375 mg total) by mouth 2 (two) times a day with meals   • NON FORMULARY Take 1 Units by mouth daily (Patient not taking: Reported on 1/31/2025)   • valACYclovir (VALTREX) 1,000 mg tablet Take 1 tablet (1,000 mg total) by mouth 2 (two) times a day for 15 days Take for 5 days for cold sores   • [DISCONTINUED] acyclovir (ZOVIRAX) 5 % ointment Apply topically every 3 (three) hours     Allergies   Allergen Reactions   • Penicillins Rash     Immunization History   Administered Date(s) Administered   • COVID-19 PFIZER VACCINE 0.3 ML IM 03/05/2021, 03/29/2021, 10/06/2021   • COVID-19 Pfizer Vac BIVALENT Murray-sucrose 12 Yr+ IM 11/02/2022   • COVID-19 Pfizer vac (Murray-sucrose, gray cap) 12 yr+ IM 05/24/2022   • COVID-19, unspecified 03/05/2021   • Hep B, adult 04/02/2018   • INFLUENZA 12/12/2014, 11/01/2018   • Influenza Split High Dose Preservative Free IM 10/29/2024   • Influenza, high dose seasonal 0.7 mL 02/17/2020, 09/13/2023   • Influenza, seasonal, injectable 12/03/2014   • Pneumococcal Conjugate 13-Valent 02/19/2020   • Pneumococcal Polysaccharide PPV23 06/30/2021   • Tdap 06/19/2006, 10/11/2013   • Zoster Vaccine Recombinant 01/28/2022, 09/28/2022     Objective   /80   Pulse 98   Temp 98.1 °F (36.7 °C)   Ht 5' 6\" (1.676 m)   Wt 83.5 kg (184 lb)   LMP  (LMP Unknown)   SpO2 96%   BMI 29.70 kg/m²     Physical Exam  Vitals and nursing note reviewed.   Constitutional:       General: She is not in acute distress.     Appearance: Normal appearance.   HENT:      Head: Normocephalic and atraumatic.   Eyes:      Pupils: Pupils are " equal, round, and reactive to light.   Cardiovascular:      Rate and Rhythm: Normal rate and regular rhythm.      Pulses: Normal pulses.      Heart sounds: Normal heart sounds.   Pulmonary:      Effort: Pulmonary effort is normal.      Breath sounds: Normal breath sounds.   Musculoskeletal:         General: Normal range of motion.      Cervical back: Normal range of motion.   Skin:     General: Skin is warm and dry.   Neurological:      General: No focal deficit present.      Mental Status: She is alert and oriented to person, place, and time. Mental status is at baseline.   Psychiatric:         Mood and Affect: Mood normal.         Behavior: Behavior normal.         Thought Content: Thought content normal.         Judgment: Judgment normal.

## 2025-02-05 ENCOUNTER — TELEPHONE (OUTPATIENT)
Dept: FAMILY MEDICINE CLINIC | Facility: CLINIC | Age: 70
End: 2025-02-05

## 2025-02-05 DIAGNOSIS — R05.3 PERSISTENT COUGH FOR 3 WEEKS OR LONGER: Primary | ICD-10-CM

## 2025-02-05 DIAGNOSIS — R05.3 PERSISTENT COUGH FOR 3 WEEKS OR LONGER: ICD-10-CM

## 2025-02-05 RX ORDER — FLUTICASONE FUROATE AND VILANTEROL 100; 25 UG/1; UG/1
1 POWDER RESPIRATORY (INHALATION) DAILY
Qty: 60 BLISTER | Refills: 1 | Status: SHIPPED | OUTPATIENT
Start: 2025-02-05 | End: 2025-02-05 | Stop reason: SDUPTHER

## 2025-02-05 RX ORDER — FLUTICASONE FUROATE AND VILANTEROL 100; 25 UG/1; UG/1
1 POWDER RESPIRATORY (INHALATION) DAILY
Qty: 60 BLISTER | Refills: 1 | Status: SHIPPED | OUTPATIENT
Start: 2025-02-05

## 2025-02-05 NOTE — TELEPHONE ENCOUNTER
Received fax thru [employer] for drug change     Budesonide/Eqpc369/4.5 mcg not covered by pts plan    Alternative   Advair hfa  Breo Ellipta  Dulera     [facility] [address]

## 2025-02-06 DIAGNOSIS — R05.3 PERSISTENT COUGH FOR 3 WEEKS OR LONGER: Primary | ICD-10-CM

## 2025-02-06 RX ORDER — BECLOMETHASONE DIPROPIONATE HFA 80 UG/1
2 AEROSOL, METERED RESPIRATORY (INHALATION) 2 TIMES DAILY
Qty: 10.6 G | Refills: 0 | Status: SHIPPED | OUTPATIENT
Start: 2025-02-06

## 2025-02-11 ENCOUNTER — TELEPHONE (OUTPATIENT)
Dept: FAMILY MEDICINE CLINIC | Facility: CLINIC | Age: 70
End: 2025-02-11

## 2025-02-13 NOTE — TELEPHONE ENCOUNTER
PA for beclomethasone (Qvar RediHaler) 80 MCG/ACT inhaler SUBMITTED to     via    [x]Pending sale to [facility]-KEY: [id number]  []Surescripts-Case ID #   []Availity-Auth ID # NDC #   []Faxed to plan   []Other website   []Phone call Case ID #     [x]PA sent as URGENT    All office notes, labs and other pertaining documents and studies sent. Clinical questions answered. Awaiting determination from insurance company.     Turnaround time for your insurance to make a decision on your Prior Authorization can take 7-21 business days.

## 2025-02-17 NOTE — TELEPHONE ENCOUNTER
PA for beclomethasone (Qvar RediHaler) 80 MCG/ACT inhaler  DENIED    Reason:(Screenshot if applicable)        Message sent to office clinical pool Yes    Denial letter scanned into Media Yes    Appeal started No (Provider will need to decide if appeal is warranted and send clinical documentation to Prior Authorization Team for initiation.)    **Please follow up with your patient regarding denial and next steps**

## 2025-02-26 ENCOUNTER — HOSPITAL ENCOUNTER (OUTPATIENT)
Dept: MAMMOGRAPHY | Facility: MEDICAL CENTER | Age: 70
Discharge: HOME | End: 2025-02-26
Payer: MEDICARE

## 2025-02-26 VITALS — WEIGHT: 184.08 LBS | HEIGHT: 66 IN | BODY MASS INDEX: 29.58 KG/M2

## 2025-02-26 DIAGNOSIS — Z12.31 ENCOUNTER FOR SCREENING MAMMOGRAM FOR MALIGNANT NEOPLASM OF BREAST: ICD-10-CM

## 2025-02-26 PROCEDURE — 77063 BREAST TOMOSYNTHESIS BI: CPT

## 2025-02-26 PROCEDURE — 77067 SCR MAMMO BI INCL CAD: CPT

## 2025-03-04 ENCOUNTER — RESULTS FOLLOW-UP (OUTPATIENT)
Dept: FAMILY MEDICINE CLINIC | Facility: CLINIC | Age: 70
End: 2025-03-04

## 2025-04-26 DIAGNOSIS — G35 MULTIPLE SCLEROSIS (HCC): ICD-10-CM

## 2025-04-28 RX ORDER — NAPROXEN 375 MG/1
TABLET ORAL
Qty: 180 TABLET | Refills: 1 | Status: SHIPPED | OUTPATIENT
Start: 2025-04-28